# Patient Record
Sex: FEMALE | Race: WHITE | NOT HISPANIC OR LATINO | Employment: OTHER | URBAN - METROPOLITAN AREA
[De-identification: names, ages, dates, MRNs, and addresses within clinical notes are randomized per-mention and may not be internally consistent; named-entity substitution may affect disease eponyms.]

---

## 2018-02-22 ENCOUNTER — HOSPITAL ENCOUNTER (OUTPATIENT)
Dept: ORTHOPEDIC SURGERY | Facility: CLINIC | Age: 68
Discharge: HOME | End: 2018-02-22
Attending: ORTHOPAEDIC SURGERY | Admitting: ORTHOPAEDIC SURGERY

## 2018-03-14 ENCOUNTER — HOSPITAL ENCOUNTER (OUTPATIENT)
Dept: ORTHOPEDIC SURGERY | Facility: CLINIC | Age: 68
Discharge: HOME | End: 2018-03-14
Attending: ORTHOPAEDIC SURGERY | Admitting: ORTHOPAEDIC SURGERY

## 2018-03-20 ENCOUNTER — HOSPITAL ENCOUNTER (OUTPATIENT)
Dept: MRI IMAGING | Facility: HOSPITAL | Age: 68
Discharge: HOME | End: 2018-03-20
Attending: ORTHOPAEDIC SURGERY | Admitting: ORTHOPAEDIC SURGERY

## 2018-04-06 ENCOUNTER — HOSPITAL ENCOUNTER (OUTPATIENT)
Dept: PREADMISSION TESTING | Facility: HOSPITAL | Age: 68
Discharge: HOME | End: 2018-04-06
Attending: ORTHOPAEDIC SURGERY | Admitting: ORTHOPAEDIC SURGERY

## 2018-04-06 LAB
ANION GAP SERPL CALC-SCNC: 12.4 MMOL/L (ref 10–20)
APTT BLD: 25.1 SEC (ref 24–31)
BASOPHILS # BLD AUTO: 0 10*3/UL (ref 0–0.2)
BASOPHILS NFR BLD AUTO: 1 % (ref 0–2)
BUN SERPL-MCNC: 10 MG/DL (ref 8–20)
BUN/CREAT SERPL: 14.3 (ref 5.4–26.2)
CALCIUM SERPL-MCNC: 9.1 MG/DL (ref 8.9–10.3)
CHLORIDE SERPL-SCNC: 102 MMOL/L (ref 101–111)
CONV CO2: 30 MMOL/L (ref 22–32)
CREAT UR-MCNC: 0.7 MG/DL (ref 0.4–1)
DIFFERENTIAL METHOD BLD: (no result)
EOSINOPHIL # BLD AUTO: 0.1 10*3/UL (ref 0–0.3)
EOSINOPHIL # BLD AUTO: 2 % (ref 0–3)
ERYTHROCYTE [DISTWIDTH] IN BLOOD BY AUTOMATED COUNT: 12 % (ref 11.5–14.5)
GLUCOSE SERPL-MCNC: 104 MG/DL (ref 65–99)
HCT VFR BLD AUTO: 39.7 % (ref 35–49)
HGB BLD-MCNC: 13.3 G/DL (ref 12–15)
INR PPP: 1
LYMPHOCYTES # BLD AUTO: 2.3 10*3/UL (ref 0.8–4.8)
LYMPHOCYTES NFR BLD AUTO: 43 % (ref 18–42)
MCH RBC QN AUTO: 32.4 PG (ref 26–32)
MCHC RBC AUTO-ENTMCNC: 33.6 G/DL (ref 32–36)
MCV RBC AUTO: 96.5 FL (ref 80–94)
MONOCYTES # BLD AUTO: 0.6 10*3/UL (ref 0.1–1.3)
MONOCYTES NFR BLD AUTO: 10 % (ref 2–11)
NEUTROPHILS # BLD AUTO: 2.4 10*3/UL (ref 2.3–8.6)
NEUTROPHILS NFR BLD AUTO: 44 % (ref 50–75)
NRBC BLD AUTO-RTO: 0 /100{WBCS}
NRBC/RBC NFR BLD MANUAL: 0 10*3/UL
PLATELET # BLD AUTO: 293 10*3/UL (ref 150–450)
PMV BLD AUTO: 8.1 FL (ref 7.4–10.4)
POTASSIUM SERPL-SCNC: 4.4 MMOL/L (ref 3.6–5.1)
PROTHROMBIN TIME: 10.5 SEC (ref 9.6–11.7)
RBC # BLD AUTO: 4.12 10*6/UL (ref 4–5.4)
SODIUM SERPL-SCNC: 140 MMOL/L (ref 136–144)
WBC # BLD AUTO: 5.4 10*3/UL (ref 4.5–11.5)

## 2018-04-13 ENCOUNTER — HOSPITAL ENCOUNTER (OUTPATIENT)
Dept: PREOP | Facility: HOSPITAL | Age: 68
Setting detail: HOSPITAL OUTPATIENT SURGERY
Discharge: HOME | End: 2018-04-13
Attending: ORTHOPAEDIC SURGERY | Admitting: ORTHOPAEDIC SURGERY

## 2018-05-09 ENCOUNTER — HOSPITAL ENCOUNTER (OUTPATIENT)
Dept: OTHER | Facility: HOSPITAL | Age: 68
Setting detail: RECURRING SERIES
Discharge: HOME | End: 2018-07-29
Attending: ORTHOPAEDIC SURGERY | Admitting: ORTHOPAEDIC SURGERY

## 2018-05-10 ENCOUNTER — HOSPITAL ENCOUNTER (OUTPATIENT)
Dept: ORTHOPEDIC SURGERY | Facility: CLINIC | Age: 68
Setting detail: SPECIMEN
Discharge: HOME | End: 2018-05-10
Attending: PHYSICIAN ASSISTANT | Admitting: PHYSICIAN ASSISTANT

## 2018-05-10 LAB
ALBUMIN SERPL-MCNC: 4.3 G/DL (ref 3.5–4.8)
ALBUMIN/GLOB SERPL: 1.7 {RATIO} (ref 1–1.7)
ALP SERPL-CCNC: 77 IU/L (ref 32–91)
ALT SERPL-CCNC: 46 IU/L (ref 14–54)
ANION GAP SERPL CALC-SCNC: 14.5 MMOL/L (ref 10–20)
AST SERPL-CCNC: 24 IU/L (ref 15–41)
BASOPHILS # BLD AUTO: 0 10*3/UL (ref 0–0.2)
BASOPHILS NFR BLD AUTO: 0 % (ref 0–2)
BILIRUB SERPL-MCNC: 0.2 MG/DL (ref 0.3–1.2)
BUN SERPL-MCNC: 14 MG/DL (ref 8–20)
BUN/CREAT SERPL: 20 (ref 5.4–26.2)
CALCIUM SERPL-MCNC: 9.3 MG/DL (ref 8.9–10.3)
CHLORIDE SERPL-SCNC: 101 MMOL/L (ref 101–111)
CONV CO2: 27 MMOL/L (ref 22–32)
CONV TOTAL PROTEIN: 6.9 G/DL (ref 6.1–7.9)
CREAT UR-MCNC: 0.7 MG/DL (ref 0.4–1)
DIFFERENTIAL METHOD BLD: (no result)
EOSINOPHIL # BLD AUTO: 0.1 10*3/UL (ref 0–0.3)
EOSINOPHIL # BLD AUTO: 2 % (ref 0–3)
ERYTHROCYTE [DISTWIDTH] IN BLOOD BY AUTOMATED COUNT: 12 % (ref 11.5–14.5)
GLOBULIN UR ELPH-MCNC: 2.6 G/DL (ref 2.5–3.8)
GLUCOSE SERPL-MCNC: 91 MG/DL (ref 65–99)
HCT VFR BLD AUTO: 40.5 % (ref 35–49)
HGB BLD-MCNC: 13.3 G/DL (ref 12–15)
LYMPHOCYTES # BLD AUTO: 3 10*3/UL (ref 0.8–4.8)
LYMPHOCYTES NFR BLD AUTO: 40 % (ref 18–42)
MAGNESIUM SERPL-MCNC: 1.9 MG/DL (ref 1.8–2.5)
MCH RBC QN AUTO: 31.8 PG (ref 26–32)
MCHC RBC AUTO-ENTMCNC: 32.9 G/DL (ref 32–36)
MCV RBC AUTO: 96.5 FL (ref 80–94)
MONOCYTES # BLD AUTO: 0.7 10*3/UL (ref 0.1–1.3)
MONOCYTES NFR BLD AUTO: 10 % (ref 2–11)
NEUTROPHILS # BLD AUTO: 3.5 10*3/UL (ref 2.3–8.6)
NEUTROPHILS NFR BLD AUTO: 48 % (ref 50–75)
NRBC BLD AUTO-RTO: 0 /100{WBCS}
NRBC/RBC NFR BLD MANUAL: 0 10*3/UL
PHOSPHATE SERPL-MCNC: 3 MG/DL (ref 2.4–4.7)
PLATELET # BLD AUTO: 316 10*3/UL (ref 150–450)
PMV BLD AUTO: 7.9 FL (ref 7.4–10.4)
POTASSIUM SERPL-SCNC: 4.5 MMOL/L (ref 3.6–5.1)
RBC # BLD AUTO: 4.2 10*6/UL (ref 4–5.4)
SODIUM SERPL-SCNC: 138 MMOL/L (ref 136–144)
WBC # BLD AUTO: 7.4 10*3/UL (ref 4.5–11.5)

## 2018-06-12 ENCOUNTER — HOSPITAL ENCOUNTER (OUTPATIENT)
Dept: OTHER | Facility: HOSPITAL | Age: 68
Discharge: HOME | End: 2018-06-12
Attending: PHYSICIAN ASSISTANT | Admitting: PHYSICIAN ASSISTANT

## 2018-08-21 ENCOUNTER — CONVERSION ENCOUNTER (OUTPATIENT)
Dept: PAIN MEDICINE | Facility: CLINIC | Age: 68
End: 2018-08-21

## 2018-08-23 ENCOUNTER — HOSPITAL ENCOUNTER (OUTPATIENT)
Dept: PAIN MEDICINE | Facility: HOSPITAL | Age: 68
Discharge: HOME | End: 2018-08-23
Attending: PHYSICAL MEDICINE & REHABILITATION | Admitting: PHYSICAL MEDICINE & REHABILITATION

## 2018-08-23 LAB
AMPHETAMINES UR QL SCN: NEGATIVE
BARBITURATES UR QL SCN: NEGATIVE
BENZODIAZ UR QL SCN: ABNORMAL
BZE UR QL SCN: NEGATIVE
CREAT 24H UR-MCNC: ABNORMAL MG/DL
METHADONE UR QL SCN: NEGATIVE
OPIATE CONFIRMATION URINE: ABNORMAL
OPIATES TESTED UR SCN: ABNORMAL
PCP UR QL: NEGATIVE
THC SERPLBLD CFM-MCNC: NEGATIVE NG/ML

## 2018-08-30 ENCOUNTER — HOSPITAL ENCOUNTER (OUTPATIENT)
Dept: PAIN MEDICINE | Facility: HOSPITAL | Age: 68
Discharge: HOME | End: 2018-08-30
Attending: PHYSICAL MEDICINE & REHABILITATION | Admitting: PHYSICAL MEDICINE & REHABILITATION

## 2018-10-16 ENCOUNTER — HOSPITAL ENCOUNTER (OUTPATIENT)
Dept: PAIN MEDICINE | Facility: HOSPITAL | Age: 68
Discharge: HOME | End: 2018-10-16
Attending: PHYSICAL MEDICINE & REHABILITATION | Admitting: PHYSICAL MEDICINE & REHABILITATION

## 2018-12-13 ENCOUNTER — HOSPITAL ENCOUNTER (OUTPATIENT)
Dept: PAIN MEDICINE | Facility: HOSPITAL | Age: 68
Discharge: HOME | End: 2018-12-13
Attending: PHYSICAL MEDICINE & REHABILITATION | Admitting: PHYSICAL MEDICINE & REHABILITATION

## 2018-12-19 ENCOUNTER — HOSPITAL ENCOUNTER (OUTPATIENT)
Dept: OTHER | Facility: HOSPITAL | Age: 68
Setting detail: SPECIMEN
Discharge: HOME | End: 2018-12-19
Attending: NURSE PRACTITIONER | Admitting: NURSE PRACTITIONER

## 2018-12-19 LAB
ALBUMIN SERPL-MCNC: 4.3 G/DL (ref 3.5–4.8)
ALBUMIN/GLOB SERPL: 1.9 {RATIO} (ref 1–1.7)
ALP SERPL-CCNC: 70 IU/L (ref 32–91)
ALT SERPL-CCNC: 36 IU/L (ref 14–54)
ANION GAP SERPL CALC-SCNC: 12.8 MMOL/L (ref 10–20)
AST SERPL-CCNC: 22 IU/L (ref 15–41)
BASOPHILS # BLD AUTO: 0 10*3/UL (ref 0–0.2)
BASOPHILS NFR BLD AUTO: 0 % (ref 0–2)
BILIRUB SERPL-MCNC: 0.3 MG/DL (ref 0.3–1.2)
BUN SERPL-MCNC: 9 MG/DL (ref 8–20)
BUN/CREAT SERPL: 15 (ref 5.4–26.2)
CALCIUM SERPL-MCNC: 9.1 MG/DL (ref 8.9–10.3)
CHLORIDE SERPL-SCNC: 102 MMOL/L (ref 101–111)
CHOLEST SERPL-MCNC: 182 MG/DL
CHOLEST/HDLC SERPL: 2.9 {RATIO}
CONV CO2: 26 MMOL/L (ref 22–32)
CONV LDL CHOLESTEROL DIRECT: 107 MG/DL (ref 0–100)
CONV TOTAL PROTEIN: 6.6 G/DL (ref 6.1–7.9)
CREAT UR-MCNC: 0.6 MG/DL (ref 0.4–1)
DIFFERENTIAL METHOD BLD: (no result)
EOSINOPHIL # BLD AUTO: 0.2 10*3/UL (ref 0–0.3)
EOSINOPHIL # BLD AUTO: 2 % (ref 0–3)
ERYTHROCYTE [DISTWIDTH] IN BLOOD BY AUTOMATED COUNT: 13 % (ref 11.5–14.5)
GLOBULIN UR ELPH-MCNC: 2.3 G/DL (ref 2.5–3.8)
GLUCOSE SERPL-MCNC: 90 MG/DL (ref 65–99)
HCT VFR BLD AUTO: 40.5 % (ref 35–49)
HDLC SERPL-MCNC: 62 MG/DL
HGB BLD-MCNC: 13.5 G/DL (ref 12–15)
LDLC/HDLC SERPL: 1.7 {RATIO}
LIPID INTERPRETATION: ABNORMAL
LYMPHOCYTES # BLD AUTO: 2.5 10*3/UL (ref 0.8–4.8)
LYMPHOCYTES NFR BLD AUTO: 37 % (ref 18–42)
MCH RBC QN AUTO: 32.9 PG (ref 26–32)
MCHC RBC AUTO-ENTMCNC: 33.4 G/DL (ref 32–36)
MCV RBC AUTO: 98.6 FL (ref 80–94)
MONOCYTES # BLD AUTO: 0.6 10*3/UL (ref 0.1–1.3)
MONOCYTES NFR BLD AUTO: 9 % (ref 2–11)
NEUTROPHILS # BLD AUTO: 3.6 10*3/UL (ref 2.3–8.6)
NEUTROPHILS NFR BLD AUTO: 52 % (ref 50–75)
NRBC BLD AUTO-RTO: 0 /100{WBCS}
NRBC/RBC NFR BLD MANUAL: 0 10*3/UL
PLATELET # BLD AUTO: 284 10*3/UL (ref 150–450)
PMV BLD AUTO: 8.3 FL (ref 7.4–10.4)
POTASSIUM SERPL-SCNC: 3.8 MMOL/L (ref 3.6–5.1)
RBC # BLD AUTO: 4.1 10*6/UL (ref 4–5.4)
SODIUM SERPL-SCNC: 137 MMOL/L (ref 136–144)
TRIGL SERPL-MCNC: 137 MG/DL
TSH SERPL-ACNC: 0.67 UIU/ML (ref 0.34–5.6)
VIT B12 SERPL-MCNC: 127 PG/ML (ref 180–914)
VLDLC SERPL CALC-MCNC: 13 MG/DL
WBC # BLD AUTO: 7 10*3/UL (ref 4.5–11.5)

## 2018-12-20 LAB
25(OH)D3 SERPL-MCNC: 22 NG/ML (ref 30–100)
HBA1C MFR BLD: 5.7 % (ref 0–5.6)

## 2018-12-28 ENCOUNTER — HOSPITAL ENCOUNTER (OUTPATIENT)
Dept: CARDIOLOGY | Facility: HOSPITAL | Age: 68
Discharge: HOME | End: 2018-12-28
Attending: NURSE PRACTITIONER | Admitting: NURSE PRACTITIONER

## 2019-01-14 ENCOUNTER — HOSPITAL ENCOUNTER (OUTPATIENT)
Dept: OTHER | Facility: HOSPITAL | Age: 69
Discharge: HOME | End: 2019-01-14
Attending: NURSE PRACTITIONER | Admitting: NURSE PRACTITIONER

## 2019-01-17 ENCOUNTER — HOSPITAL ENCOUNTER (OUTPATIENT)
Dept: MRI IMAGING | Facility: HOSPITAL | Age: 69
Discharge: HOME | End: 2019-01-17
Attending: NURSE PRACTITIONER | Admitting: NURSE PRACTITIONER

## 2019-02-12 ENCOUNTER — HOSPITAL ENCOUNTER (OUTPATIENT)
Dept: PAIN MEDICINE | Facility: HOSPITAL | Age: 69
Discharge: HOME | End: 2019-02-12
Attending: PHYSICAL MEDICINE & REHABILITATION | Admitting: PHYSICAL MEDICINE & REHABILITATION

## 2019-05-14 ENCOUNTER — HOSPITAL ENCOUNTER (OUTPATIENT)
Dept: PAIN MEDICINE | Facility: HOSPITAL | Age: 69
Discharge: HOME | End: 2019-05-14
Attending: PHYSICAL MEDICINE & REHABILITATION | Admitting: PHYSICAL MEDICINE & REHABILITATION

## 2019-06-03 ENCOUNTER — HOSPITAL ENCOUNTER (OUTPATIENT)
Dept: PAIN MEDICINE | Facility: HOSPITAL | Age: 69
Discharge: HOME | End: 2019-06-03
Attending: PHYSICAL MEDICINE & REHABILITATION | Admitting: PHYSICAL MEDICINE & REHABILITATION

## 2019-06-03 ENCOUNTER — CONVERSION ENCOUNTER (OUTPATIENT)
Dept: BEHAVIORAL HEALTH | Facility: OTHER | Age: 69
End: 2019-06-03

## 2019-06-04 VITALS
SYSTOLIC BLOOD PRESSURE: 142 MMHG | DIASTOLIC BLOOD PRESSURE: 90 MMHG | OXYGEN SATURATION: 94 % | HEART RATE: 66 BPM | HEIGHT: 65 IN | RESPIRATION RATE: 16 BRPM | BODY MASS INDEX: 31.65 KG/M2 | WEIGHT: 190 LBS

## 2019-06-04 VITALS — WEIGHT: 204 LBS | BODY MASS INDEX: 33.99 KG/M2 | HEIGHT: 65 IN

## 2019-06-07 NOTE — PROCEDURES
HPI: All over pain, 10/10 at worst, 7/10 at best, 7/10 today, always present, varies, aching, stabbing, worse with all activity, interferes with ADLs, sleep, quality of life, failed meds, injections, surgery, PT. Had multiple injections with Dr. Villalpando and   Roddy. Imaging with R TKA, severe L knee OA, full thickness RTC tear. Prior notes reviewed, as abov, with referral for pain management, started on Norco 7.5/325mg BID, then TID, helps but not strong enough, now 10/325mg QID prn. Tried rotating to Percocet   with headaches, stopped.    Referring MD: Val Julien NP  Age: 68 Years Old  Sex: Female  Race: White    Pain Assessment   Previous Pain Rating : 6  Current Pain Ratin  Last Dose Pain medicine [place] 6/3@0100  Have your bowel habits changed since you started taking pain medication? No  Epidural History Epidurals help      Past Medical History:     Reviewed history from 2018 and no changes required:        Cervical/lumbar disk dz        Chronic pain syndrome        DDD/OA        manic Depression        Insomnia        Venous insufficiecny        HTN        Osteoporosis (DEXA )- fosamax 2yrs, prolia x1        COPD - severe        H/O MRSA        H/O epilepsy        H/O diverticulitis        Anxiety        G E R D        Arthritis        Sleep apnea        CVA/Stroke    Past Surgical History:     Reviewed history from 2018 and no changes required:        Total hysterectomy - noncancer        2-(r) knee surgeries        Cholecystectomy        ulnar nerve release        pancreatic stent        Brain surgery - meningiomas 2010        Breast biopsies        Lasik surgery        right knee replacement- 2013        left shoulder scope/ cuff repair 18         D&C        Tubal ligaition         Appendectomy        Back Surgery    Family History Summary:      Reviewed history Last on 2019 and no changes required:2019  Other Family Member - Has Family History of Other Cancer -  Entered On: 2018  Other Family Member - Has Family History of High Cholesterol - Entered On: 2018  Other Family Member - Has Family History of Hypertension - Entered On: 2018  Other Family Member - Has Family History of Heart Disease - Entered On: 2018  Other Family Member - Has Family History of Diabetes - Entered On: 2018    General Comments - FH:  No breast cancer, no colon cancer, no GYN cancer    Father - COPD    Has FH of substance abuse    Social History:     Reviewed history from 2018 and no changes required:        Patient has never smoked.        Passive Smoke: N        Alcohol Use: Y        Drug Use: N        HIV/High Risk: N        Regular Exercise: N                Passive Smoke: N        Vital Signs:    Patient Profile:    68 Years Old Female  CC:         Lars. knee pain  Height:     65 inches (170.18 cm)  Weight:     190 pounds  BMI:        31.61     O2 Sat:     94 %  Temp:       97.8 degrees F oral  Pulse rate: 66 / minute  Resp:       16 per minute  BP Sittin / 90    Patient has a risk of falls? No  Patient in pain?    Yes    Problems: Active problems were reviewed with the patient during this visit.  Medications: Medications were reviewed with the patient during this visit.  Allergies: Allergies were reviewed with the patient during this visit.        Vitals Entered By: Christina Dill MA ([place]  3, 2019 11:00 AM)      Risk Factors:     Smoked Tobacco Use:  Former smoker     Cigarettes:  Yes -- 1/2 pack(s) per day,    Pack-years:  60+        Year started:          Year quit:  2013  Smokeless Tobacco Use:  Never  Passive smoke exposure:  no  Drug use:  no  HIV high-risk behavior:  no  Caffeine use:  1 drinks per day  Alcohol use:  yes     Drinks per day:  social  Exercise:  no  Seatbelt use:  100 %  Sun Exposure:  rarely    Family History Risk Factors:     Family History of MI in females < 65 years old:  no     Family History of MI in males < 55 years old:   no    Previous Tobacco Use: Signed On - 05/14/2019  Smoked Tobacco Use:  Former smoker     Cigarettes:  Yes -- 1/2 pack(s) per day,    Pack-years:  60+        Year started:  1970        Year quit:  9/2013  Smokeless Tobacco Use:  Never     Counseled to quit/cut down:  yes  Passive smoke exposure:  no  Drug use:  no  HIV high-risk behavior:  no  Caffeine use:  1 drinks per day        Review of Systems        See HPI    Physical Exam   General  General appearance: well developed, well nourished, no acute distress  Communication ability: communicates by voice, normal quality  Gait and station: antalgic    Mental Status Exam   Mental Status: AAO x3  Thought Content: Intact  Behavior: Appropriate    Head and Face   Inspection: normocephalic, no scars, lesions, or masses  Facial strength: no weakness    Respiratory   Auscultation: clear to auscultation bilaterally, no rales, rhonchi, or wheezes    Cardiovascular   Auscultation: RRR, no murmur, rub, or gallop      EXAM:     Assessment   New Problems:  Osteoarthritis, knee, left (ICD-715.96) (BUG73-A49.9)      Plan   Updated Medication List:   HYDROCODONE-ACETAMINOPHEN  MG ORAL TABLET (HYDROCODONE-ACETAMINOPHEN) Take one tab q6h prn  pain              DX: M25.50. DNF until 7/16/19.  ALBUTEROL NEB 0.083% (ALBUTEROL SULFATE) USE 1 VIAL THREE TIMES DAILY FOR A TOTAL OF THREE VIALS DAILY.  TRAZODONE  MG ORAL TABLET (TRAZODONE HCL) 1 by mouth every night  SYMBICORT 160-4.5 MCG INHALER (BUDESONIDE-FORMOTEROL FUMARATE) INHALE 2 PUFFS BY MOUTH TWICE A DAY  COMBIVENT RESPIMAT  MCG/ACT INHALATION AEROSOL SOLUTION (IPRATROPIUM-ALBUTEROL) One inhalation every 4 hours as needed (maximum 6/day)  POTASSIUM CHLORIDE ER 10 MEQ ORAL TABLET EXTENDED RELEASE (POTASSIUM CHLORIDE) Take one tab po w each dose of Lasix.  FUROSEMIDE 40 MG ORAL TABLET (FUROSEMIDE) Take 1 tab in am and 1 tab in pm prn edema.  ONDANSETRON HCL 8 MG TABLET (ONDANSETRON HCL) TAKE ONE TABLET BY MOUTH  "TWICE A DAY AS NEEDED  ALPRAZOLAM 1 MG ORAL TABLET (ALPRAZOLAM) 1 po QHS  as needed for anxiety  ATENOLOL 50 MG TABLET (ATENOLOL) TAKE 1 TABLET BY MOUTH TWICE A DAY    New Orders:   Ofc Vst, Est Level III [[id number]]  Ortho Consult [OrthOC]    .    Patient Instructions:  1)  INspect in order. Mod risk per SOAPP. UDS 8/23/18 in order.  2)  Increased to Norco 10/325mg TID prn, then QID. Rotated to Percocet 10/325mg QID prn, was taking as high as Oxycodone 15mg TID in recent past, had headaches, restarted Norco, reports doing well now.  3)  Failed Diclofenac cream. Reorder RxAlt #1 cream.  4)  Cont other meds as prescribed.  5)  Referred to Rheum for prior dz of autoimmune disease.  6)  Failed PT, surgery, injections.  7)  Ordered  PT, eval for other needs.  8)  1st of 3 L knee Supartz injections, failed steroids in past. Is s/p R TKA.  9)  May need to inject b/l CMC thumb joints.  10)  Referral to Gayle Pate to eval for revision of R TKA.  11)  RTC for injections then in 3 months for f/u.      Pain Management Procedure Care Plan   Medication Administration:     ]DATE OF PROCEDURE: [place]  3, 2019    KNEE SUPARTZ INJECTION    PREOPERATIVE DIAGNOSIS:  Knee pain    POSTOPERATIVE DIAGNOSIS:  Knee pain    PROCEDURE PERFORMED:  LEFT SUPARTZ KNEE INJECTION     The patient understands the risks and benefits of the procedure and wishes to proceed. The patient was seen in the preoperative area. Patient's consent was obtained and updated. Vitals were taken. Patient was then placed in a seated position for the   injection. Site marked for an inferior lateral approach, and a 22 gauge 1.5\" needle was passed through skin anesthetized with 1% Lidocaine without epinephrine. The needle tip was advanced to the joint space, the syringes were exchanged, and Supartz was   injected after negative aspiration. The patient tolerated with no iris-procedural complications.  A sterile dressing was placed over the puncture " site.              Electronically signed by Kervin Hanna MD on 06/03/2019 at 11:31 AM  ________________________________________________________________________       Disclaimer: Converted Note message may not contain all data elements that existed in the legacy source system. Please see [email or web address] for the original note details.

## 2019-06-10 ENCOUNTER — CONVERSION ENCOUNTER (OUTPATIENT)
Dept: PAIN MEDICINE | Facility: CLINIC | Age: 69
End: 2019-06-10

## 2019-06-10 ENCOUNTER — HOSPITAL ENCOUNTER (OUTPATIENT)
Dept: PAIN MEDICINE | Facility: HOSPITAL | Age: 69
Discharge: HOME | End: 2019-06-10
Attending: PHYSICAL MEDICINE & REHABILITATION | Admitting: PHYSICAL MEDICINE & REHABILITATION

## 2019-06-12 VITALS
HEART RATE: 71 BPM | RESPIRATION RATE: 16 BRPM | SYSTOLIC BLOOD PRESSURE: 161 MMHG | DIASTOLIC BLOOD PRESSURE: 93 MMHG | BODY MASS INDEX: 32.32 KG/M2 | WEIGHT: 194 LBS | HEIGHT: 65 IN | OXYGEN SATURATION: 93 %

## 2019-06-13 NOTE — PROCEDURES
HPI: All over pain, 10/10 at worst, 7/10 at best, 8/10 today, always present, varies, aching, stabbing, worse with all activity, interferes with ADLs, sleep, quality of life, failed meds, injections, surgery, PT. Had multiple injections with Dr. Villalpando and   Roddy. Imaging with R TKA, severe L knee OA, full thickness RTC tear. Prior notes reviewed, as abov, with referral for pain management, started on Norco 7.5/325mg BID, then TID, helps but not strong enough, now 10/325mg QID prn. Tried rotating to Percocet   with headaches, stopped.    Referring MD: Val Julien NP  Age: 68 Years Old  Sex: Female  Race: White    Pain Assessment   Previous Pain Rating : 8  Current Pain Ratin  Last Dose Pain medicine [place] 6/10@ 0300  Have your bowel habits changed since you started taking pain medication? No  Epidural History Knee injections help      Past Medical History:     Reviewed history from 2018 and no changes required:        Cervical/lumbar disk dz        Chronic pain syndrome        DDD/OA        manic Depression        Insomnia        Venous insufficiecny        HTN        Osteoporosis (DEXA )- fosamax 2yrs, prolia x1        COPD - severe        H/O MRSA        H/O epilepsy        H/O diverticulitis        Anxiety        G E R D        Arthritis        Sleep apnea        CVA/Stroke    Past Surgical History:     Reviewed history from 2018 and no changes required:        Total hysterectomy - noncancer        2-(r) knee surgeries        Cholecystectomy        ulnar nerve release        pancreatic stent        Brain surgery - meningiomas 2010        Breast biopsies        Lasik surgery        right knee replacement- 2013        left shoulder scope/ cuff repair 18         D&C        Tubal ligaition         Appendectomy        Back Surgery    Family History Summary:      Reviewed history Last on 2019 and no changes required:06/10/2019  Other Family Member - Has Family History of Other  Cancer - Entered On: 2018  Other Family Member - Has Family History of High Cholesterol - Entered On: 2018  Other Family Member - Has Family History of Hypertension - Entered On: 2018  Other Family Member - Has Family History of Heart Disease - Entered On: 2018  Other Family Member - Has Family History of Diabetes - Entered On: 2018    General Comments - FH:  No breast cancer, no colon cancer, no GYN cancer    Father - COPD    Has FH of substance abuse    Social History:     Reviewed history from 2018 and no changes required:        Patient has never smoked.        Passive Smoke: N        Alcohol Use: Y        Drug Use: N        HIV/High Risk: N        Regular Exercise: N                Passive Smoke: N        Vital Signs:    Patient Profile:    68 Years Old Female  CC:         Lars. knee pain  Height:     65 inches (170.18 cm)  Weight:     194 pounds  BMI:        32.28     O2 Sat:     93 %  Temp:       98.2 degrees F oral  Pulse rate: 71 / minute  Resp:       16 per minute  BP Sittin / 93    Patient has a risk of falls? No  Patient in pain?    Yes    Problems: Active problems were reviewed with the patient during this visit.  Medications: Medications were reviewed with the patient during this visit.  Allergies: Allergies were reviewed with the patient during this visit.        Vitals Entered By: Christina Dill MA ([place] 10, 2019 11:28 AM)      Risk Factors:     Smoked Tobacco Use:  Former smoker     Cigarettes:  Yes -- 1/2 pack(s) per day,    Pack-years:  60+        Year started:          Year quit:  2013  Smokeless Tobacco Use:  Never  Passive smoke exposure:  no  Drug use:  no  HIV high-risk behavior:  no  Caffeine use:  1 drinks per day  Alcohol use:  yes     Drinks per day:  social  Exercise:  no  Seatbelt use:  100 %  Sun Exposure:  rarely    Family History Risk Factors:     Family History of MI in females < 65 years old:  no     Family History of MI in males < 55 years  old:  no    Previous Tobacco Use: Signed On - 06/03/2019  Smoked Tobacco Use:  Former smoker     Cigarettes:  Yes -- 1/2 pack(s) per day,    Pack-years:  60+        Year started:  1970        Year quit:  9/2013  Smokeless Tobacco Use:  Never     Counseled to quit/cut down:  yes  Passive smoke exposure:  no  Drug use:  no  HIV high-risk behavior:  no  Caffeine use:  1 drinks per day        Review of Systems        See HPI    General       Complains of fatigue.       Denies fever and chills.    Eyes       Complains of vision loss - both eyes.    ENT       Complains of decreased hearing.       Denies difficulty swallowing.    CV       Complains of chest pain or discomfort.    Resp       Complains of shortness of breath.    GI       Complains of nausea and abdominal pain.       Denies vomiting, diarrhea, constipation and stool incontinence.           Denies inability to control bladder.    MS       Complains of joint pain, joint swelling, back pain and neck pain.    Derm       Complains of rash.    Neuro       Complains of headaches and dizziness.       Denies numbness and weakness.    Physical Exam   General  General appearance: well developed, well nourished, no acute distress  Communication ability: communicates by voice, normal quality  Gait and station: antalgic    Mental Status Exam   Mental Status: AAO x3  Thought Content: Intact  Behavior: Appropriate    Head and Face   Inspection: normocephalic, no scars, lesions, or masses  Facial strength: no weakness    Respiratory   Auscultation: clear to auscultation bilaterally, no rales, rhonchi, or wheezes    Cardiovascular   Auscultation: RRR, no murmur, rub, or gallop    Abdomen   Abdomen: soft, non-tender, non-distended, no masses, bowel sounds normal    Extremities   Pedal pulses: pulses 1+, symmetric  Periph. circulation: no cyanosis, clubbing, edema, or varicosities      EXAM:     Neuro   Reflexes: R Arm 1+  L arm 1+  R Leg 1+  L Leg 1+    Strength: Arms Normal   Strength: Legs Normal  Sensation: SILT BUE and BLE      Assessment   Status of Existing Problems:  Assessed Osteoarthritis, knee, left as unchanged - Kervin Hanna MD  Assessed Knee Pain, left as improved - Kervin Hanna MD  Assessed Pain in joint involving multiple sites as unchanged - Kervin Hanna MD  Assessed Low back pain, chronic as deteriorated - Kervin Hanna MD  Assessed Neck pain, chronic as unchanged - Kervin Hanna MD    Plan   Updated Medication List:   HYDROCODONE-ACETAMINOPHEN  MG ORAL TABLET (HYDROCODONE-ACETAMINOPHEN) Take one tab q6h prn  pain              DX: M25.50. DNF until 7/16/19.  ALBUTEROL NEB 0.083% (ALBUTEROL SULFATE) USE 1 VIAL THREE TIMES DAILY FOR A TOTAL OF THREE VIALS DAILY.  TRAZODONE  MG ORAL TABLET (TRAZODONE HCL) 1 by mouth every night  SYMBICORT 160-4.5 MCG INHALER (BUDESONIDE-FORMOTEROL FUMARATE) INHALE 2 PUFFS BY MOUTH TWICE A DAY  COMBIVENT RESPIMAT  MCG/ACT INHALATION AEROSOL SOLUTION (IPRATROPIUM-ALBUTEROL) One inhalation every 4 hours as needed (maximum 6/day)  POTASSIUM CHLORIDE ER 10 MEQ ORAL TABLET EXTENDED RELEASE (POTASSIUM CHLORIDE) Take one tab po w each dose of Lasix.  FUROSEMIDE 40 MG ORAL TABLET (FUROSEMIDE) Take 1 tab in am and 1 tab in pm prn edema.  ONDANSETRON HCL 8 MG TABLET (ONDANSETRON HCL) TAKE ONE TABLET BY MOUTH TWICE A DAY AS NEEDED  ALPRAZOLAM 1 MG ORAL TABLET (ALPRAZOLAM) 1 po QHS  as needed for anxiety  ATENOLOL 50 MG TABLET (ATENOLOL) TAKE 1 TABLET BY MOUTH TWICE A DAY    New Orders:   Ofc Vst, Est Level IV [[id number]]    .    Patient Instructions:  1)  IN[place] in order. Mod risk per SOAPP. UDS 8/23/18 in order.  2)  2)  Increased to Norco 10/325mg TID prn, then QID, stop. Begin Percocet 10mg QID prn, was taking as high as Oxycodone 15mg TID in recent past.  3)  Failed Diclofenac cream. Reorder RxAlt #1 cream.  4)  Cont other meds as prescribed.  5)  Referred to Rheum for prior dz of autoimmune disease.  6)   "Failed PT, surgery, injections.  7)  Ordered HH PT, eval for other needs.  8)  2nd of 3 L knee Supartz injections, failed steroids in past. Is s/p R TKA.  9)  May need to inject b/l CMC thumb joints.  10)  Referral to Gayle Pate to eval for revision of R TKA.  11)  RTC for injections then in 3 months for f/u.      Pain Management Procedure Care Plan   Medication Administration:     ]DATE OF PROCEDURE: [place] 10, 2019    KNEE SUPARTZ INJECTION    PREOPERATIVE DIAGNOSIS:  Knee pain    POSTOPERATIVE DIAGNOSIS:  Knee pain    PROCEDURE PERFORMED:  LEFT SUPARTZ KNEE INJECTION     The patient understands the risks and benefits of the procedure and wishes to proceed. The patient was seen in the preoperative area. Patient's consent was obtained and updated. Vitals were taken. Patient was then placed in a seated position for the   injection. Site marked for an inferior lateral approach, and a 22 gauge 1.5\" needle was passed through skin anesthetized with 1% Lidocaine without epinephrine. The needle tip was advanced to the joint space, the syringes were exchanged, and Supartz was   injected after negative aspiration. The patient tolerated with no iris-procedural complications.  A sterile dressing was placed over the puncture site.              Electronically signed by Kervin Hanna MD on 06/10/2019 at 12:09 PM  ________________________________________________________________________       Disclaimer: Converted Note message may not contain all data elements that existed in the [employer] source system. Please see [employer] System for the original note details.  "

## 2019-06-16 ENCOUNTER — HOSPITAL ENCOUNTER (EMERGENCY)
Dept: GENERAL RADIOLOGY | Facility: HOSPITAL | Age: 69
Discharge: HOME | End: 2019-06-16

## 2019-06-16 ENCOUNTER — HOSPITAL ENCOUNTER (EMERGENCY)
Facility: HOSPITAL | Age: 69
Discharge: HOME | End: 2019-06-16
Attending: EMERGENCY MEDICINE | Admitting: EMERGENCY MEDICINE

## 2019-06-16 VITALS
HEART RATE: 65 BPM | TEMPERATURE: 97.9 F | DIASTOLIC BLOOD PRESSURE: 72 MMHG | HEIGHT: 66 IN | OXYGEN SATURATION: 90 % | WEIGHT: 191.58 LBS | RESPIRATION RATE: 20 BRPM | SYSTOLIC BLOOD PRESSURE: 136 MMHG | BODY MASS INDEX: 30.79 KG/M2

## 2019-06-16 DIAGNOSIS — J44.9 CHRONIC OBSTRUCTIVE PULMONARY DISEASE, UNSPECIFIED COPD TYPE (HCC): Primary | ICD-10-CM

## 2019-06-16 LAB
ALBUMIN SERPL-MCNC: 4.4 G/DL (ref 3.5–4.8)
ALBUMIN/GLOB SERPL: 1.9 G/DL (ref 1–1.7)
ALP SERPL-CCNC: 64 U/L (ref 32–91)
ALT SERPL W P-5'-P-CCNC: 22 U/L (ref 14–54)
ANION GAP SERPL CALCULATED.3IONS-SCNC: 12 MMOL/L (ref 10–20)
APTT PPP: 26.3 SECONDS (ref 24–31)
AST SERPL-CCNC: 16 U/L (ref 15–41)
BASOPHILS # BLD AUTO: 0 10*3/MM3 (ref 0–0.2)
BASOPHILS NFR BLD AUTO: 0.3 % (ref 0–1.5)
BILIRUB SERPL-MCNC: 0.7 MG/DL (ref 0.3–1.2)
BNP SERPL-MCNC: 73 PG/ML
BUN SERPL-MCNC: 8 MG/DL (ref 8–20)
BUN/CREAT SERPL: 11.4 (ref 5.4–26.2)
CALCIUM SPEC-SCNC: 9.5 MG/DL (ref 8.9–10.3)
CHLORIDE SERPL-SCNC: 101 MMOL/L (ref 101–111)
CO2 SERPL-SCNC: 28 MMOL/L (ref 22–32)
CREAT SERPL-MCNC: 0.7 MG/DL (ref 0.4–1)
DEPRECATED RDW RBC AUTO: 43.3 FL (ref 37–54)
EOSINOPHIL # BLD AUTO: 0.2 10*3/MM3 (ref 0–0.4)
EOSINOPHIL NFR BLD AUTO: 2.1 % (ref 0.3–6.2)
ERYTHROCYTE [DISTWIDTH] IN BLOOD BY AUTOMATED COUNT: 12.5 % (ref 12.3–15.4)
GFR SERPL CREATININE-BSD FRML MDRD: 83 ML/MIN/1.73
GLOBULIN UR ELPH-MCNC: 2.3 GM/DL (ref 2.5–3.8)
GLUCOSE SERPL-MCNC: 130 MG/DL (ref 65–99)
HCT VFR BLD AUTO: 44.7 % (ref 34–46.6)
HGB BLD-MCNC: 15.2 G/DL (ref 12–15.9)
INR PPP: 1.01 (ref 0.9–1.1)
LYMPHOCYTES # BLD AUTO: 2.3 10*3/MM3 (ref 0.7–3.1)
LYMPHOCYTES NFR BLD AUTO: 28.6 % (ref 19.6–45.3)
MCH RBC QN AUTO: 33.6 PG (ref 26.6–33)
MCHC RBC AUTO-ENTMCNC: 33.9 G/DL (ref 31.5–35.7)
MCV RBC AUTO: 99.3 FL (ref 79–97)
MONOCYTES # BLD AUTO: 0.6 10*3/MM3 (ref 0.1–0.9)
MONOCYTES NFR BLD AUTO: 7.6 % (ref 5–12)
NEUTROPHILS NFR BLD AUTO: 4.9 10*3/MM3 (ref 1.7–7)
NEUTROPHILS NFR BLD AUTO: 61.4 % (ref 42.7–76)
NRBC BLD AUTO-RTO: 0.1 /100 WBC (ref 0–0.2)
PLATELET # BLD AUTO: 317 10*3/MM3 (ref 140–450)
PMV BLD AUTO: 7.7 FL (ref 6–12)
POTASSIUM SERPL-SCNC: 3.4 MMOL/L (ref 3.6–5.1)
PROT SERPL-MCNC: 6.7 G/DL (ref 6.1–7.9)
PROTHROMBIN TIME: 10.4 SECONDS (ref 9.6–11.7)
RBC # BLD AUTO: 4.51 10*6/MM3 (ref 3.77–5.28)
SODIUM SERPL-SCNC: 141 MMOL/L (ref 136–144)
TROPONIN I SERPL-MCNC: <0.03 NG/ML (ref 0–0.03)
WBC NRBC COR # BLD: 8 10*3/MM3 (ref 3.4–10.8)

## 2019-06-16 PROCEDURE — 94640 AIRWAY INHALATION TREATMENT: CPT

## 2019-06-16 PROCEDURE — 99284 EMERGENCY DEPT VISIT MOD MDM: CPT

## 2019-06-16 PROCEDURE — 71045 X-RAY EXAM CHEST 1 VIEW: CPT

## 2019-06-16 PROCEDURE — 84484 ASSAY OF TROPONIN QUANT: CPT | Performed by: EMERGENCY MEDICINE

## 2019-06-16 PROCEDURE — 93005 ELECTROCARDIOGRAM TRACING: CPT | Performed by: EMERGENCY MEDICINE

## 2019-06-16 PROCEDURE — 80053 COMPREHEN METABOLIC PANEL: CPT | Performed by: EMERGENCY MEDICINE

## 2019-06-16 PROCEDURE — 85730 THROMBOPLASTIN TIME PARTIAL: CPT | Performed by: EMERGENCY MEDICINE

## 2019-06-16 PROCEDURE — 85610 PROTHROMBIN TIME: CPT | Performed by: EMERGENCY MEDICINE

## 2019-06-16 PROCEDURE — 85025 COMPLETE CBC W/AUTO DIFF WBC: CPT | Performed by: EMERGENCY MEDICINE

## 2019-06-16 PROCEDURE — 83880 ASSAY OF NATRIURETIC PEPTIDE: CPT | Performed by: EMERGENCY MEDICINE

## 2019-06-16 RX ORDER — METHYLPREDNISOLONE SODIUM SUCCINATE 125 MG/2ML
125 INJECTION, POWDER, LYOPHILIZED, FOR SOLUTION INTRAMUSCULAR; INTRAVENOUS ONCE
Status: DISCONTINUED | OUTPATIENT
Start: 2019-06-16 | End: 2019-06-16 | Stop reason: HOSPADM

## 2019-06-16 RX ORDER — PREDNISONE 20 MG/1
20 TABLET ORAL 2 TIMES DAILY
Qty: 10 TABLET | Refills: 0 | Status: SHIPPED | OUTPATIENT
Start: 2019-06-16 | End: 2019-10-29

## 2019-06-16 RX ORDER — IPRATROPIUM BROMIDE AND ALBUTEROL SULFATE 2.5; .5 MG/3ML; MG/3ML
3 SOLUTION RESPIRATORY (INHALATION) ONCE
Status: COMPLETED | OUTPATIENT
Start: 2019-06-16 | End: 2019-06-16

## 2019-06-16 RX ADMIN — IPRATROPIUM BROMIDE AND ALBUTEROL SULFATE 3 ML: .5; 3 SOLUTION RESPIRATORY (INHALATION) at 04:16

## 2019-06-26 NOTE — TELEPHONE ENCOUNTER
Phone Note   Outgoing Call  Summary of Call: New patient referral for R knee pain.  Call placed by: Gayla Coffman,  [place]  3, 2019 11:33 AM    Follow-up for Phone Call   Follow-up Details: LVM for return call for appt   Follow-up by: Sis Cruz MA,  June 26, 2019 10:10 AM            Electronically signed by Sis Cruz MA on 06/26/2019 at 10:11 AM  ________________________________________________________________________       Disclaimer: Converted Note message may not contain all data elements that existed in the [employer] source system. Please see [employer] for the original note details.

## 2019-07-05 RX ORDER — TRAZODONE HYDROCHLORIDE 100 MG/1
100 TABLET ORAL NIGHTLY
Qty: 90 TABLET | Refills: 1 | Status: SHIPPED | OUTPATIENT
Start: 2019-07-05 | End: 2020-09-24 | Stop reason: ALTCHOICE

## 2019-08-13 ENCOUNTER — OFFICE VISIT (OUTPATIENT)
Dept: PAIN MEDICINE | Facility: CLINIC | Age: 69
End: 2019-08-13

## 2019-08-13 VITALS
WEIGHT: 184 LBS | RESPIRATION RATE: 16 BRPM | OXYGEN SATURATION: 95 % | TEMPERATURE: 98.2 F | DIASTOLIC BLOOD PRESSURE: 85 MMHG | HEIGHT: 66 IN | HEART RATE: 76 BPM | SYSTOLIC BLOOD PRESSURE: 134 MMHG | BODY MASS INDEX: 29.57 KG/M2

## 2019-08-13 DIAGNOSIS — G89.29 CHRONIC PAIN OF LEFT KNEE: ICD-10-CM

## 2019-08-13 DIAGNOSIS — M25.512 CHRONIC LEFT SHOULDER PAIN: ICD-10-CM

## 2019-08-13 DIAGNOSIS — M47.817 LUMBOSACRAL SPONDYLOSIS WITHOUT MYELOPATHY: ICD-10-CM

## 2019-08-13 DIAGNOSIS — M54.2 NECK PAIN, CHRONIC: ICD-10-CM

## 2019-08-13 DIAGNOSIS — G89.29 NECK PAIN, CHRONIC: ICD-10-CM

## 2019-08-13 DIAGNOSIS — M47.812 CERVICAL SPONDYLOSIS WITHOUT MYELOPATHY: ICD-10-CM

## 2019-08-13 DIAGNOSIS — M53.3 SACROILIAC JOINT PAIN: ICD-10-CM

## 2019-08-13 DIAGNOSIS — G89.29 CHRONIC LEFT SHOULDER PAIN: ICD-10-CM

## 2019-08-13 DIAGNOSIS — G89.4 CHRONIC PAIN DISORDER: ICD-10-CM

## 2019-08-13 DIAGNOSIS — M17.12 PRIMARY OSTEOARTHRITIS OF LEFT KNEE: ICD-10-CM

## 2019-08-13 DIAGNOSIS — M48.061 SPINAL STENOSIS OF LUMBAR REGION, UNSPECIFIED WHETHER NEUROGENIC CLAUDICATION PRESENT: ICD-10-CM

## 2019-08-13 DIAGNOSIS — M25.562 CHRONIC PAIN OF LEFT KNEE: ICD-10-CM

## 2019-08-13 DIAGNOSIS — F19.90 CURRENT DRUG USE: Primary | ICD-10-CM

## 2019-08-13 DIAGNOSIS — M48.02 CERVICAL STENOSIS OF SPINAL CANAL: ICD-10-CM

## 2019-08-13 DIAGNOSIS — G89.29 CHRONIC BILATERAL LOW BACK PAIN, WITH SCIATICA PRESENCE UNSPECIFIED: Primary | ICD-10-CM

## 2019-08-13 DIAGNOSIS — M25.50 PAIN IN JOINT INVOLVING MULTIPLE SITES: ICD-10-CM

## 2019-08-13 DIAGNOSIS — M54.5 CHRONIC BILATERAL LOW BACK PAIN, WITH SCIATICA PRESENCE UNSPECIFIED: Primary | ICD-10-CM

## 2019-08-13 PROBLEM — M17.9 OSTEOARTHRITIS OF KNEE: Status: ACTIVE | Noted: 2019-06-03

## 2019-08-13 PROBLEM — M54.50 CHRONIC LOW BACK PAIN: Status: ACTIVE | Noted: 2018-08-23

## 2019-08-13 PROCEDURE — G0463 HOSPITAL OUTPT CLINIC VISIT: HCPCS | Performed by: PHYSICAL MEDICINE & REHABILITATION

## 2019-08-13 PROCEDURE — 99213 OFFICE O/P EST LOW 20 MIN: CPT | Performed by: PHYSICAL MEDICINE & REHABILITATION

## 2019-08-13 RX ORDER — OXYCODONE AND ACETAMINOPHEN 10; 325 MG/1; MG/1
1 TABLET ORAL EVERY 6 HOURS PRN
Qty: 120 TABLET | Refills: 0 | Status: SHIPPED | OUTPATIENT
Start: 2019-08-13 | End: 2019-08-13 | Stop reason: SDUPTHER

## 2019-08-13 RX ORDER — ATENOLOL 50 MG/1
TABLET ORAL EVERY 12 HOURS
COMMUNITY
Start: 2017-01-13 | End: 2019-10-29 | Stop reason: SDUPTHER

## 2019-08-13 RX ORDER — ONDANSETRON HYDROCHLORIDE 8 MG/1
TABLET, FILM COATED ORAL EVERY 12 HOURS
COMMUNITY
Start: 2017-07-25 | End: 2019-09-17 | Stop reason: SDUPTHER

## 2019-08-13 RX ORDER — FUROSEMIDE 40 MG/1
TABLET ORAL
COMMUNITY
Start: 2018-08-20 | End: 2019-10-29

## 2019-08-13 RX ORDER — ALPRAZOLAM 1 MG/1
TABLET ORAL
COMMUNITY
Start: 2013-03-18 | End: 2019-10-29 | Stop reason: SDUPTHER

## 2019-08-13 RX ORDER — TRAZODONE HYDROCHLORIDE 100 MG/1
TABLET ORAL EVERY 24 HOURS
COMMUNITY
Start: 2018-12-19 | End: 2019-10-29 | Stop reason: SDUPTHER

## 2019-08-13 RX ORDER — OXYCODONE AND ACETAMINOPHEN 10; 325 MG/1; MG/1
1 TABLET ORAL EVERY 6 HOURS PRN
Qty: 120 TABLET | Refills: 0 | Status: SHIPPED | OUTPATIENT
Start: 2019-08-13 | End: 2019-11-12 | Stop reason: SDUPTHER

## 2019-08-13 RX ORDER — OXYCODONE AND ACETAMINOPHEN 10; 325 MG/1; MG/1
1 TABLET ORAL EVERY 6 HOURS PRN
Refills: 0 | COMMUNITY
Start: 2019-07-16 | End: 2019-08-13 | Stop reason: SDUPTHER

## 2019-08-13 RX ORDER — ALBUTEROL SULFATE 2.5 MG/3ML
SOLUTION RESPIRATORY (INHALATION)
COMMUNITY
Start: 2016-12-05 | End: 2019-10-29 | Stop reason: SDUPTHER

## 2019-08-13 RX ORDER — POTASSIUM CHLORIDE 750 MG/1
TABLET, FILM COATED, EXTENDED RELEASE ORAL
COMMUNITY
Start: 2018-08-20 | End: 2019-10-29 | Stop reason: SDUPTHER

## 2019-08-13 NOTE — PROGRESS NOTES
"Subjective   Padmini Holt is a 68 y.o. female.     All over pain, 10/10 at worst, 7/10 at best, 8/10 today, always present, varies, aching, stabbing, worse with all activity, interferes with ADLs, sleep, quality of life, failed meds, injections, surgery, PT. Had multiple injections with Dr. Villalpando and Roddy. Imaging with R TKA, severe L knee OA, full thickness RTC tear. Prior notes reviewed, as abov, with referral for pain management, started on Norco 7.5/325mg BID, then TID, helps but not strong enough, then 10/325mg QID prn. Tried rotating to Percocet with headaches, stopped, restarted, doing well. Had 2 b/l Supartz injections. Poor sleep with pineal gland tumor, has failed \"everything.\"         The following portions of the patient's history were reviewed and updated as appropriate: allergies, current medications, past family history, past medical history, past social history, past surgical history and problem list.    Review of Systems   Constitutional: Positive for fatigue. Negative for chills and fever.   HENT: Positive for hearing loss. Negative for trouble swallowing.    Eyes: Positive for visual disturbance.   Respiratory: Positive for shortness of breath.    Cardiovascular: Positive for chest pain.   Gastrointestinal: Positive for abdominal pain and nausea. Negative for constipation, diarrhea and vomiting.   Genitourinary: Negative for urinary incontinence.   Musculoskeletal: Positive for arthralgias, back pain, joint swelling and neck pain. Negative for myalgias.   Neurological: Positive for dizziness and headache. Negative for weakness and numbness.       Objective   Physical Exam   Constitutional: She is oriented to person, place, and time. She appears well-developed and well-nourished.   HENT:   Head: Normocephalic and atraumatic.   Eyes: EOM are normal. Pupils are equal, round, and reactive to light.   Neck: Normal range of motion.   Cardiovascular: Normal rate, regular rhythm, normal heart sounds and " intact distal pulses.   Pulmonary/Chest: Breath sounds normal.   Abdominal: Soft. Bowel sounds are normal. She exhibits no distension. There is no tenderness.   Neurological: She is alert and oriented to person, place, and time. She has normal strength and normal reflexes. She displays normal reflexes. No sensory deficit.   Psychiatric: She has a normal mood and affect. Her behavior is normal. Thought content normal.         Assessment/Plan   Padmini was seen today for arthritis.    Diagnoses and all orders for this visit:    Chronic bilateral low back pain, with sciatica presence unspecified    Chronic pain disorder    Pain in joint involving multiple sites    Neck pain, chronic    Chronic left shoulder pain    Sacroiliac joint pain    Cervical spondylosis without myelopathy    Lumbosacral spondylosis without myelopathy    Primary osteoarthritis of left knee    Chronic pain of left knee    Spinal stenosis of lumbar region, unspecified whether neurogenic claudication present    Cervical stenosis of spinal canal        INspect in order. Mod risk per SOAPP. UDS 8/23/18 in order.  Increased to Norco 10/325mg TID prn, then QID, stop. Begin Percocet 10mg QID prn, was taking as high as Oxycodone 15mg TID in recent past.  Failed Diclofenac cream. Reordered RxAlt #1 cream.  Cont other meds as prescribed.  Referred to Rheum for prior dz of autoimmune disease.  Failed PT, surgery, injections.  Ordered  PT, artemio for other needs.  Had 2nd of 3 L knee Supartz injections, failed steroids in past. Is s/p R TKA.  May need to inject b/l CMC thumb joints.  Referral to Gayle Pate to artemio for revision of R TKA.  RTC in 3 months for f/u.

## 2019-08-19 NOTE — TELEPHONE ENCOUNTER
Patient was seeing psych. Please see if she is still seeing them. This refill should come from psych.

## 2019-08-21 RX ORDER — ALPRAZOLAM 1 MG/1
TABLET ORAL NIGHTLY PRN
OUTPATIENT
Start: 2019-08-21

## 2019-08-28 ENCOUNTER — TRANSCRIBE ORDERS (OUTPATIENT)
Dept: ADMINISTRATIVE | Facility: HOSPITAL | Age: 69
End: 2019-08-28

## 2019-08-28 DIAGNOSIS — M25.561 RIGHT KNEE PAIN, UNSPECIFIED CHRONICITY: Primary | ICD-10-CM

## 2019-09-05 ENCOUNTER — TRANSCRIBE ORDERS (OUTPATIENT)
Dept: ADMINISTRATIVE | Facility: HOSPITAL | Age: 69
End: 2019-09-05

## 2019-09-05 ENCOUNTER — HOSPITAL ENCOUNTER (OUTPATIENT)
Dept: NUCLEAR MEDICINE | Facility: HOSPITAL | Age: 69
Discharge: HOME | End: 2019-09-05

## 2019-09-05 ENCOUNTER — LAB (OUTPATIENT)
Dept: LAB | Facility: HOSPITAL | Age: 69
End: 2019-09-05

## 2019-09-05 DIAGNOSIS — M25.561 RIGHT KNEE PAIN, UNSPECIFIED CHRONICITY: ICD-10-CM

## 2019-09-05 DIAGNOSIS — M79.651 PAIN IN RIGHT THIGH: Primary | ICD-10-CM

## 2019-09-05 DIAGNOSIS — M79.651 PAIN IN RIGHT THIGH: ICD-10-CM

## 2019-09-05 LAB
BASOPHILS # BLD AUTO: 0 10*3/MM3 (ref 0–0.2)
BASOPHILS NFR BLD AUTO: 0.5 % (ref 0–1.5)
CRP SERPL-MCNC: 0.17 MG/DL (ref 0–0.7)
DEPRECATED RDW RBC AUTO: 44.2 FL (ref 37–54)
EOSINOPHIL # BLD AUTO: 0.2 10*3/MM3 (ref 0–0.4)
EOSINOPHIL NFR BLD AUTO: 2.4 % (ref 0.3–6.2)
ERYTHROCYTE [DISTWIDTH] IN BLOOD BY AUTOMATED COUNT: 12.9 % (ref 12.3–15.4)
ERYTHROCYTE [SEDIMENTATION RATE] IN BLOOD: 5 MM/HR (ref 0–30)
HCT VFR BLD AUTO: 43.5 % (ref 34–46.6)
HGB BLD-MCNC: 14.6 G/DL (ref 12–15.9)
LYMPHOCYTES # BLD AUTO: 2.6 10*3/MM3 (ref 0.7–3.1)
LYMPHOCYTES NFR BLD AUTO: 32.2 % (ref 19.6–45.3)
MCH RBC QN AUTO: 32.8 PG (ref 26.6–33)
MCHC RBC AUTO-ENTMCNC: 33.5 G/DL (ref 31.5–35.7)
MCV RBC AUTO: 98 FL (ref 79–97)
MONOCYTES # BLD AUTO: 0.8 10*3/MM3 (ref 0.1–0.9)
MONOCYTES NFR BLD AUTO: 10 % (ref 5–12)
NEUTROPHILS # BLD AUTO: 4.4 10*3/MM3 (ref 1.7–7)
NEUTROPHILS NFR BLD AUTO: 54.9 % (ref 42.7–76)
NRBC BLD AUTO-RTO: 0 /100 WBC (ref 0–0.2)
PLATELET # BLD AUTO: 294 10*3/MM3 (ref 140–450)
PMV BLD AUTO: 8.3 FL (ref 6–12)
RBC # BLD AUTO: 4.43 10*6/MM3 (ref 3.77–5.28)
WBC NRBC COR # BLD: 8 10*3/MM3 (ref 3.4–10.8)

## 2019-09-05 PROCEDURE — 85652 RBC SED RATE AUTOMATED: CPT

## 2019-09-05 PROCEDURE — 36415 COLL VENOUS BLD VENIPUNCTURE: CPT

## 2019-09-05 PROCEDURE — 85025 COMPLETE CBC W/AUTO DIFF WBC: CPT

## 2019-09-05 PROCEDURE — 78315 BONE IMAGING 3 PHASE: CPT

## 2019-09-05 PROCEDURE — A9503 TC99M MEDRONATE: HCPCS | Performed by: ORTHOPAEDIC SURGERY

## 2019-09-05 PROCEDURE — 86140 C-REACTIVE PROTEIN: CPT

## 2019-09-05 PROCEDURE — 0 TECHNETIUM MEDRONATE KIT: Performed by: ORTHOPAEDIC SURGERY

## 2019-09-05 RX ORDER — TC 99M MEDRONATE 20 MG/10ML
27.9 INJECTION, POWDER, LYOPHILIZED, FOR SOLUTION INTRAVENOUS
Status: COMPLETED | OUTPATIENT
Start: 2019-09-05 | End: 2019-09-05

## 2019-09-05 RX ADMIN — TC 99M MEDRONATE 27.9 MILLICURIE: 20 INJECTION, POWDER, LYOPHILIZED, FOR SOLUTION INTRAVENOUS at 09:45

## 2019-09-17 RX ORDER — ONDANSETRON HYDROCHLORIDE 8 MG/1
8 TABLET, FILM COATED ORAL EVERY 12 HOURS PRN
Qty: 30 TABLET | Refills: 2 | Status: ON HOLD | OUTPATIENT
Start: 2019-09-17 | End: 2019-10-29

## 2019-10-17 ENCOUNTER — OFFICE (OUTPATIENT)
Dept: URBAN - METROPOLITAN AREA CLINIC 64 | Facility: CLINIC | Age: 69
End: 2019-10-17

## 2019-10-17 VITALS
HEART RATE: 74 BPM | DIASTOLIC BLOOD PRESSURE: 111 MMHG | WEIGHT: 182 LBS | HEIGHT: 66 IN | SYSTOLIC BLOOD PRESSURE: 164 MMHG

## 2019-10-17 DIAGNOSIS — I10 ESSENTIAL (PRIMARY) HYPERTENSION: ICD-10-CM

## 2019-10-17 DIAGNOSIS — F41.9 ANXIETY DISORDER, UNSPECIFIED: ICD-10-CM

## 2019-10-17 DIAGNOSIS — Q45.3 OTHER CONGENITAL MALFORMATIONS OF PANCREAS AND PANCREATIC DU: ICD-10-CM

## 2019-10-17 DIAGNOSIS — K58.0 IRRITABLE BOWEL SYNDROME WITH DIARRHEA: ICD-10-CM

## 2019-10-17 DIAGNOSIS — M81.0 AGE-RELATED OSTEOPOROSIS WITHOUT CURRENT PATHOLOGICAL FRACTU: ICD-10-CM

## 2019-10-17 DIAGNOSIS — Z90.49 ACQUIRED ABSENCE OF OTHER SPECIFIED PARTS OF DIGESTIVE TRACT: ICD-10-CM

## 2019-10-17 PROCEDURE — 99203 OFFICE O/P NEW LOW 30 MIN: CPT | Performed by: INTERNAL MEDICINE

## 2019-10-17 RX ORDER — PRUCALOPRIDE 2 MG/1
2 TABLET, FILM COATED ORAL
Qty: 30 | Refills: 11 | Status: ACTIVE
Start: 2019-10-17

## 2019-10-29 ENCOUNTER — APPOINTMENT (OUTPATIENT)
Dept: GENERAL RADIOLOGY | Facility: HOSPITAL | Age: 69
End: 2019-10-29

## 2019-10-29 ENCOUNTER — HOSPITAL ENCOUNTER (OUTPATIENT)
Facility: HOSPITAL | Age: 69
Setting detail: OBSERVATION
Discharge: HOME | End: 2019-10-30
Attending: EMERGENCY MEDICINE | Admitting: INTERNAL MEDICINE

## 2019-10-29 DIAGNOSIS — J44.1 CHRONIC OBSTRUCTIVE PULMONARY DISEASE WITH ACUTE EXACERBATION (HCC): ICD-10-CM

## 2019-10-29 DIAGNOSIS — R06.03 ACUTE RESPIRATORY DISTRESS: Primary | ICD-10-CM

## 2019-10-29 PROBLEM — Z87.19 HISTORY OF GASTROINTESTINAL DISEASE: Status: ACTIVE | Noted: 2019-10-29

## 2019-10-29 PROBLEM — I67.89 OTHER CEREBROVASCULAR DISEASE: Status: ACTIVE | Noted: 2018-08-21

## 2019-10-29 PROBLEM — F43.12 CHRONIC POST-TRAUMATIC STRESS DISORDER (PTSD): Status: ACTIVE | Noted: 2018-02-06

## 2019-10-29 PROBLEM — Z86.79 HISTORY OF ATRIAL FIBRILLATION: Status: ACTIVE | Noted: 2019-10-29

## 2019-10-29 PROBLEM — G40.909 SEIZURE DISORDER (HCC): Status: ACTIVE | Noted: 2019-10-29

## 2019-10-29 PROBLEM — J44.89 ASTHMA WITH COPD: Status: ACTIVE | Noted: 2018-09-04

## 2019-10-29 PROBLEM — R59.0 HILAR LYMPHADENOPATHY: Status: ACTIVE | Noted: 2017-02-15

## 2019-10-29 PROBLEM — M79.7 FIBROMYOSITIS: Status: ACTIVE | Noted: 2018-05-22

## 2019-10-29 PROBLEM — G56.03 BILATERAL CARPAL TUNNEL SYNDROME: Status: ACTIVE | Noted: 2017-03-15

## 2019-10-29 PROBLEM — IMO0002 DEGENERATION OF INTERVERTEBRAL DISC: Status: ACTIVE | Noted: 2019-10-29

## 2019-10-29 PROBLEM — F41.8 ANXIETY WITH DEPRESSION: Status: ACTIVE | Noted: 2018-04-24

## 2019-10-29 PROBLEM — M19.90 OSTEOARTHRITIS: Status: ACTIVE | Noted: 2019-10-29

## 2019-10-29 PROBLEM — Z98.1 HISTORY OF LUMBAR FUSION: Status: ACTIVE | Noted: 2017-04-06

## 2019-10-29 PROBLEM — J44.9 ASTHMA WITH COPD (HCC): Status: ACTIVE | Noted: 2018-09-04

## 2019-10-29 PROBLEM — A49.02 METHICILLIN RESISTANT STAPHYLOCOCCUS AUREUS INFECTION: Status: ACTIVE | Noted: 2019-10-29

## 2019-10-29 PROBLEM — D32.0 INTRACRANIAL MENINGIOMA (HCC): Status: ACTIVE | Noted: 2019-01-14

## 2019-10-29 PROBLEM — K21.9 GASTROESOPHAGEAL REFLUX DISEASE: Status: ACTIVE | Noted: 2018-05-10

## 2019-10-29 LAB
ALBUMIN SERPL-MCNC: 4.7 G/DL (ref 3.5–5.2)
ALBUMIN/GLOB SERPL: 1.6 G/DL
ALP SERPL-CCNC: 92 U/L (ref 39–117)
ALT SERPL W P-5'-P-CCNC: 20 U/L (ref 1–33)
ANION GAP SERPL CALCULATED.3IONS-SCNC: 12 MMOL/L (ref 5–15)
AST SERPL-CCNC: 15 U/L (ref 1–32)
B PERT DNA SPEC QL NAA+PROBE: NOT DETECTED
BASOPHILS # BLD AUTO: 0.1 10*3/MM3 (ref 0–0.2)
BASOPHILS NFR BLD AUTO: 0.7 % (ref 0–1.5)
BILIRUB SERPL-MCNC: 0.3 MG/DL (ref 0.2–1.2)
BILIRUB UR QL STRIP: NEGATIVE
BUN BLD-MCNC: 6 MG/DL (ref 8–23)
BUN/CREAT SERPL: 8.7 (ref 7–25)
C PNEUM DNA NPH QL NAA+NON-PROBE: NOT DETECTED
CALCIUM SPEC-SCNC: 9.6 MG/DL (ref 8.6–10.5)
CHLORIDE SERPL-SCNC: 101 MMOL/L (ref 98–107)
CLARITY UR: CLEAR
CO2 SERPL-SCNC: 28 MMOL/L (ref 22–29)
COLOR UR: YELLOW
CREAT BLD-MCNC: 0.69 MG/DL (ref 0.57–1)
DEPRECATED RDW RBC AUTO: 43.3 FL (ref 37–54)
EOSINOPHIL # BLD AUTO: 0.2 10*3/MM3 (ref 0–0.4)
EOSINOPHIL NFR BLD AUTO: 1.6 % (ref 0.3–6.2)
ERYTHROCYTE [DISTWIDTH] IN BLOOD BY AUTOMATED COUNT: 12.6 % (ref 12.3–15.4)
FLUAV H1 2009 PAND RNA NPH QL NAA+PROBE: NOT DETECTED
FLUAV H1 HA GENE NPH QL NAA+PROBE: NOT DETECTED
FLUAV H3 RNA NPH QL NAA+PROBE: NOT DETECTED
FLUAV SUBTYP SPEC NAA+PROBE: NOT DETECTED
FLUBV RNA ISLT QL NAA+PROBE: NOT DETECTED
GFR SERPL CREATININE-BSD FRML MDRD: 84 ML/MIN/1.73
GLOBULIN UR ELPH-MCNC: 3 GM/DL
GLUCOSE BLD-MCNC: 116 MG/DL (ref 65–99)
GLUCOSE BLDC GLUCOMTR-MCNC: 139 MG/DL (ref 70–105)
GLUCOSE BLDC GLUCOMTR-MCNC: 288 MG/DL (ref 70–105)
GLUCOSE UR STRIP-MCNC: NEGATIVE MG/DL
HADV DNA SPEC NAA+PROBE: NOT DETECTED
HCOV 229E RNA SPEC QL NAA+PROBE: NOT DETECTED
HCOV HKU1 RNA SPEC QL NAA+PROBE: NOT DETECTED
HCOV NL63 RNA SPEC QL NAA+PROBE: NOT DETECTED
HCOV OC43 RNA SPEC QL NAA+PROBE: NOT DETECTED
HCT VFR BLD AUTO: 45.8 % (ref 34–46.6)
HGB BLD-MCNC: 15.5 G/DL (ref 12–15.9)
HGB UR QL STRIP.AUTO: NEGATIVE
HMPV RNA NPH QL NAA+NON-PROBE: NOT DETECTED
HPIV1 RNA SPEC QL NAA+PROBE: NOT DETECTED
HPIV2 RNA SPEC QL NAA+PROBE: NOT DETECTED
HPIV3 RNA NPH QL NAA+PROBE: NOT DETECTED
HPIV4 P GENE NPH QL NAA+PROBE: NOT DETECTED
KETONES UR QL STRIP: NEGATIVE
LEUKOCYTE ESTERASE UR QL STRIP.AUTO: NEGATIVE
LYMPHOCYTES # BLD AUTO: 2.8 10*3/MM3 (ref 0.7–3.1)
LYMPHOCYTES NFR BLD AUTO: 27 % (ref 19.6–45.3)
M PNEUMO IGG SER IA-ACNC: NOT DETECTED
MCH RBC QN AUTO: 33.1 PG (ref 26.6–33)
MCHC RBC AUTO-ENTMCNC: 33.9 G/DL (ref 31.5–35.7)
MCV RBC AUTO: 97.9 FL (ref 79–97)
MONOCYTES # BLD AUTO: 0.9 10*3/MM3 (ref 0.1–0.9)
MONOCYTES NFR BLD AUTO: 9.2 % (ref 5–12)
NEUTROPHILS # BLD AUTO: 6.3 10*3/MM3 (ref 1.7–7)
NEUTROPHILS NFR BLD AUTO: 61.5 % (ref 42.7–76)
NITRITE UR QL STRIP: NEGATIVE
NRBC BLD AUTO-RTO: 0 /100 WBC (ref 0–0.2)
NT-PROBNP SERPL-MCNC: 167.8 PG/ML (ref 5–900)
PH UR STRIP.AUTO: 7 [PH] (ref 5–8)
PLATELET # BLD AUTO: 302 10*3/MM3 (ref 140–450)
PMV BLD AUTO: 8 FL (ref 6–12)
POTASSIUM BLD-SCNC: 4.3 MMOL/L (ref 3.5–5.2)
PROCALCITONIN SERPL-MCNC: <0.02 NG/ML (ref 0.1–0.25)
PROT SERPL-MCNC: 7.7 G/DL (ref 6–8.5)
PROT UR QL STRIP: NEGATIVE
RBC # BLD AUTO: 4.68 10*6/MM3 (ref 3.77–5.28)
RHINOVIRUS RNA SPEC NAA+PROBE: DETECTED
RSV RNA NPH QL NAA+NON-PROBE: NOT DETECTED
SODIUM BLD-SCNC: 141 MMOL/L (ref 136–145)
SP GR UR STRIP: 1.01 (ref 1–1.03)
TROPONIN T SERPL-MCNC: <0.01 NG/ML (ref 0–0.03)
TROPONIN T SERPL-MCNC: <0.01 NG/ML (ref 0–0.03)
UROBILINOGEN UR QL STRIP: NORMAL
WBC NRBC COR # BLD: 10.2 10*3/MM3 (ref 3.4–10.8)

## 2019-10-29 PROCEDURE — 96372 THER/PROPH/DIAG INJ SC/IM: CPT

## 2019-10-29 PROCEDURE — 99220 PR INITIAL OBSERVATION CARE/DAY 70 MINUTES: CPT | Performed by: INTERNAL MEDICINE

## 2019-10-29 PROCEDURE — 85025 COMPLETE CBC W/AUTO DIFF WBC: CPT | Performed by: EMERGENCY MEDICINE

## 2019-10-29 PROCEDURE — 71045 X-RAY EXAM CHEST 1 VIEW: CPT

## 2019-10-29 PROCEDURE — G0378 HOSPITAL OBSERVATION PER HR: HCPCS

## 2019-10-29 PROCEDURE — 93005 ELECTROCARDIOGRAM TRACING: CPT | Performed by: EMERGENCY MEDICINE

## 2019-10-29 PROCEDURE — 84145 PROCALCITONIN (PCT): CPT | Performed by: PHYSICIAN ASSISTANT

## 2019-10-29 PROCEDURE — 81003 URINALYSIS AUTO W/O SCOPE: CPT | Performed by: EMERGENCY MEDICINE

## 2019-10-29 PROCEDURE — 94760 N-INVAS EAR/PLS OXIMETRY 1: CPT

## 2019-10-29 PROCEDURE — 96365 THER/PROPH/DIAG IV INF INIT: CPT

## 2019-10-29 PROCEDURE — 96375 TX/PRO/DX INJ NEW DRUG ADDON: CPT

## 2019-10-29 PROCEDURE — 25010000002 METHYLPREDNISOLONE PER 125 MG: Performed by: EMERGENCY MEDICINE

## 2019-10-29 PROCEDURE — 94799 UNLISTED PULMONARY SVC/PX: CPT

## 2019-10-29 PROCEDURE — 99284 EMERGENCY DEPT VISIT MOD MDM: CPT

## 2019-10-29 PROCEDURE — 82962 GLUCOSE BLOOD TEST: CPT

## 2019-10-29 PROCEDURE — 80053 COMPREHEN METABOLIC PANEL: CPT | Performed by: EMERGENCY MEDICINE

## 2019-10-29 PROCEDURE — 84484 ASSAY OF TROPONIN QUANT: CPT | Performed by: EMERGENCY MEDICINE

## 2019-10-29 PROCEDURE — 25010000002 ENOXAPARIN PER 10 MG: Performed by: PHYSICIAN ASSISTANT

## 2019-10-29 PROCEDURE — 63710000001 INSULIN LISPRO (HUMAN) PER 5 UNITS: Performed by: PHYSICIAN ASSISTANT

## 2019-10-29 PROCEDURE — 0099U HC BIOFIRE FILMARRAY RESP PANEL 1: CPT | Performed by: EMERGENCY MEDICINE

## 2019-10-29 PROCEDURE — 94640 AIRWAY INHALATION TREATMENT: CPT

## 2019-10-29 PROCEDURE — 83880 ASSAY OF NATRIURETIC PEPTIDE: CPT | Performed by: EMERGENCY MEDICINE

## 2019-10-29 PROCEDURE — 84484 ASSAY OF TROPONIN QUANT: CPT | Performed by: PHYSICIAN ASSISTANT

## 2019-10-29 RX ORDER — ACETAMINOPHEN 325 MG/1
650 TABLET ORAL EVERY 4 HOURS PRN
Status: DISCONTINUED | OUTPATIENT
Start: 2019-10-29 | End: 2019-10-30 | Stop reason: HOSPADM

## 2019-10-29 RX ORDER — DOCUSATE SODIUM 100 MG/1
100 CAPSULE, LIQUID FILLED ORAL 2 TIMES DAILY PRN
Status: DISCONTINUED | OUTPATIENT
Start: 2019-10-29 | End: 2019-10-30 | Stop reason: HOSPADM

## 2019-10-29 RX ORDER — IPRATROPIUM BROMIDE AND ALBUTEROL SULFATE 2.5; .5 MG/3ML; MG/3ML
3 SOLUTION RESPIRATORY (INHALATION)
Status: DISCONTINUED | OUTPATIENT
Start: 2019-10-29 | End: 2019-10-30 | Stop reason: HOSPADM

## 2019-10-29 RX ORDER — BISACODYL 10 MG
10 SUPPOSITORY, RECTAL RECTAL DAILY PRN
Status: DISCONTINUED | OUTPATIENT
Start: 2019-10-29 | End: 2019-10-30 | Stop reason: HOSPADM

## 2019-10-29 RX ORDER — ALPRAZOLAM 1 MG/1
1 TABLET ORAL NIGHTLY PRN
Status: DISCONTINUED | OUTPATIENT
Start: 2019-10-29 | End: 2019-10-30 | Stop reason: HOSPADM

## 2019-10-29 RX ORDER — PREDNISONE 20 MG/1
40 TABLET ORAL
Status: DISCONTINUED | OUTPATIENT
Start: 2019-10-30 | End: 2019-10-30 | Stop reason: HOSPADM

## 2019-10-29 RX ORDER — SODIUM CHLORIDE 0.9 % (FLUSH) 0.9 %
10 SYRINGE (ML) INJECTION AS NEEDED
Status: DISCONTINUED | OUTPATIENT
Start: 2019-10-29 | End: 2019-10-30 | Stop reason: HOSPADM

## 2019-10-29 RX ORDER — ALUMINA, MAGNESIA, AND SIMETHICONE 2400; 2400; 240 MG/30ML; MG/30ML; MG/30ML
15 SUSPENSION ORAL EVERY 6 HOURS PRN
Status: DISCONTINUED | OUTPATIENT
Start: 2019-10-29 | End: 2019-10-30 | Stop reason: HOSPADM

## 2019-10-29 RX ORDER — ACETAMINOPHEN 650 MG/1
650 SUPPOSITORY RECTAL EVERY 4 HOURS PRN
Status: DISCONTINUED | OUTPATIENT
Start: 2019-10-29 | End: 2019-10-30 | Stop reason: HOSPADM

## 2019-10-29 RX ORDER — ACETAMINOPHEN 160 MG/5ML
650 SOLUTION ORAL EVERY 4 HOURS PRN
Status: DISCONTINUED | OUTPATIENT
Start: 2019-10-29 | End: 2019-10-30 | Stop reason: HOSPADM

## 2019-10-29 RX ORDER — ONDANSETRON 4 MG/1
8 TABLET, FILM COATED ORAL EVERY 12 HOURS PRN
Status: DISCONTINUED | OUTPATIENT
Start: 2019-10-29 | End: 2019-10-30 | Stop reason: HOSPADM

## 2019-10-29 RX ORDER — MAGNESIUM SULFATE 1 G/100ML
1 INJECTION INTRAVENOUS AS NEEDED
Status: DISCONTINUED | OUTPATIENT
Start: 2019-10-29 | End: 2019-10-30 | Stop reason: HOSPADM

## 2019-10-29 RX ORDER — SODIUM CHLORIDE 0.9 % (FLUSH) 0.9 %
10 SYRINGE (ML) INJECTION EVERY 12 HOURS SCHEDULED
Status: DISCONTINUED | OUTPATIENT
Start: 2019-10-29 | End: 2019-10-30 | Stop reason: HOSPADM

## 2019-10-29 RX ORDER — METHYLPREDNISOLONE SODIUM SUCCINATE 125 MG/2ML
125 INJECTION, POWDER, LYOPHILIZED, FOR SOLUTION INTRAMUSCULAR; INTRAVENOUS ONCE
Status: COMPLETED | OUTPATIENT
Start: 2019-10-29 | End: 2019-10-29

## 2019-10-29 RX ORDER — DEXTROSE MONOHYDRATE 25 G/50ML
25 INJECTION, SOLUTION INTRAVENOUS
Status: DISCONTINUED | OUTPATIENT
Start: 2019-10-29 | End: 2019-10-30 | Stop reason: HOSPADM

## 2019-10-29 RX ORDER — CALCIUM CARBONATE 200(500)MG
2 TABLET,CHEWABLE ORAL 2 TIMES DAILY PRN
Status: DISCONTINUED | OUTPATIENT
Start: 2019-10-29 | End: 2019-10-30 | Stop reason: HOSPADM

## 2019-10-29 RX ORDER — DIPHENHYDRAMINE HCL 25 MG
25 TABLET ORAL NIGHTLY PRN
Status: DISCONTINUED | OUTPATIENT
Start: 2019-10-29 | End: 2019-10-30 | Stop reason: HOSPADM

## 2019-10-29 RX ORDER — ATENOLOL 50 MG/1
50 TABLET ORAL 2 TIMES DAILY
Status: DISCONTINUED | OUTPATIENT
Start: 2019-10-29 | End: 2019-10-30 | Stop reason: HOSPADM

## 2019-10-29 RX ORDER — POTASSIUM CHLORIDE 1.5 G/1.77G
40 POWDER, FOR SOLUTION ORAL AS NEEDED
Status: DISCONTINUED | OUTPATIENT
Start: 2019-10-29 | End: 2019-10-30 | Stop reason: HOSPADM

## 2019-10-29 RX ORDER — POTASSIUM CHLORIDE 20 MEQ/1
40 TABLET, EXTENDED RELEASE ORAL AS NEEDED
Status: DISCONTINUED | OUTPATIENT
Start: 2019-10-29 | End: 2019-10-30 | Stop reason: HOSPADM

## 2019-10-29 RX ORDER — ONDANSETRON 2 MG/ML
4 INJECTION INTRAMUSCULAR; INTRAVENOUS EVERY 6 HOURS PRN
Status: DISCONTINUED | OUTPATIENT
Start: 2019-10-29 | End: 2019-10-30 | Stop reason: HOSPADM

## 2019-10-29 RX ORDER — ALBUTEROL SULFATE 2.5 MG/3ML
2.5 SOLUTION RESPIRATORY (INHALATION) ONCE
Status: COMPLETED | OUTPATIENT
Start: 2019-10-29 | End: 2019-10-29

## 2019-10-29 RX ORDER — NICOTINE POLACRILEX 4 MG
15 LOZENGE BUCCAL
Status: DISCONTINUED | OUTPATIENT
Start: 2019-10-29 | End: 2019-10-30 | Stop reason: HOSPADM

## 2019-10-29 RX ORDER — BUDESONIDE AND FORMOTEROL FUMARATE DIHYDRATE 160; 4.5 UG/1; UG/1
2 AEROSOL RESPIRATORY (INHALATION)
Status: DISCONTINUED | OUTPATIENT
Start: 2019-10-29 | End: 2019-10-30 | Stop reason: HOSPADM

## 2019-10-29 RX ORDER — ONDANSETRON 4 MG/1
4 TABLET, FILM COATED ORAL EVERY 6 HOURS PRN
Status: DISCONTINUED | OUTPATIENT
Start: 2019-10-29 | End: 2019-10-30 | Stop reason: HOSPADM

## 2019-10-29 RX ORDER — TRAZODONE HYDROCHLORIDE 50 MG/1
100 TABLET ORAL NIGHTLY
Status: DISCONTINUED | OUTPATIENT
Start: 2019-10-29 | End: 2019-10-30 | Stop reason: HOSPADM

## 2019-10-29 RX ORDER — FUROSEMIDE 40 MG/1
40 TABLET ORAL 2 TIMES DAILY PRN
Status: DISCONTINUED | OUTPATIENT
Start: 2019-10-29 | End: 2019-10-30 | Stop reason: HOSPADM

## 2019-10-29 RX ORDER — MAGNESIUM SULFATE HEPTAHYDRATE 40 MG/ML
2 INJECTION, SOLUTION INTRAVENOUS AS NEEDED
Status: DISCONTINUED | OUTPATIENT
Start: 2019-10-29 | End: 2019-10-30 | Stop reason: HOSPADM

## 2019-10-29 RX ORDER — IPRATROPIUM BROMIDE AND ALBUTEROL SULFATE 2.5; .5 MG/3ML; MG/3ML
3 SOLUTION RESPIRATORY (INHALATION) ONCE
Status: COMPLETED | OUTPATIENT
Start: 2019-10-29 | End: 2019-10-29

## 2019-10-29 RX ADMIN — IPRATROPIUM BROMIDE AND ALBUTEROL SULFATE 3 ML: .5; 3 SOLUTION RESPIRATORY (INHALATION) at 19:32

## 2019-10-29 RX ADMIN — ACETAMINOPHEN 650 MG: 325 TABLET ORAL at 21:13

## 2019-10-29 RX ADMIN — ENOXAPARIN SODIUM 40 MG: 40 INJECTION SUBCUTANEOUS at 17:36

## 2019-10-29 RX ADMIN — TRAZODONE HYDROCHLORIDE 100 MG: 50 TABLET ORAL at 21:00

## 2019-10-29 RX ADMIN — IPRATROPIUM BROMIDE AND ALBUTEROL SULFATE 3 ML: .5; 3 SOLUTION RESPIRATORY (INHALATION) at 12:55

## 2019-10-29 RX ADMIN — ATENOLOL 50 MG: 50 TABLET ORAL at 21:00

## 2019-10-29 RX ADMIN — ALPRAZOLAM 1 MG: 1 TABLET ORAL at 21:10

## 2019-10-29 RX ADMIN — DOXYCYCLINE 100 MG: 100 INJECTION, POWDER, LYOPHILIZED, FOR SOLUTION INTRAVENOUS at 14:50

## 2019-10-29 RX ADMIN — ALBUTEROL SULFATE 2.5 MG: 2.5 SOLUTION RESPIRATORY (INHALATION) at 14:28

## 2019-10-29 RX ADMIN — BUDESONIDE AND FORMOTEROL FUMARATE DIHYDRATE 2 PUFF: 160; 4.5 AEROSOL RESPIRATORY (INHALATION) at 19:34

## 2019-10-29 RX ADMIN — INSULIN LISPRO 4 UNITS: 100 INJECTION, SOLUTION INTRAVENOUS; SUBCUTANEOUS at 21:01

## 2019-10-29 RX ADMIN — Medication 10 ML: at 21:02

## 2019-10-29 RX ADMIN — ALBUTEROL SULFATE 2.5 MG: 2.5 SOLUTION RESPIRATORY (INHALATION) at 12:55

## 2019-10-29 RX ADMIN — METHYLPREDNISOLONE SODIUM SUCCINATE 125 MG: 125 INJECTION, POWDER, FOR SOLUTION INTRAMUSCULAR; INTRAVENOUS at 13:12

## 2019-10-30 VITALS
TEMPERATURE: 98.3 F | HEIGHT: 66 IN | RESPIRATION RATE: 18 BRPM | SYSTOLIC BLOOD PRESSURE: 119 MMHG | WEIGHT: 183.64 LBS | DIASTOLIC BLOOD PRESSURE: 77 MMHG | OXYGEN SATURATION: 92 % | BODY MASS INDEX: 29.51 KG/M2 | HEART RATE: 75 BPM

## 2019-10-30 LAB
ANION GAP SERPL CALCULATED.3IONS-SCNC: 10 MMOL/L (ref 5–15)
BASOPHILS # BLD AUTO: 0 10*3/MM3 (ref 0–0.2)
BASOPHILS NFR BLD AUTO: 0.3 % (ref 0–1.5)
BUN BLD-MCNC: 9 MG/DL (ref 8–23)
BUN/CREAT SERPL: 18.4 (ref 7–25)
CALCIUM SPEC-SCNC: 9 MG/DL (ref 8.6–10.5)
CHLORIDE SERPL-SCNC: 106 MMOL/L (ref 98–107)
CO2 SERPL-SCNC: 27 MMOL/L (ref 22–29)
CREAT BLD-MCNC: 0.49 MG/DL (ref 0.57–1)
DEPRECATED RDW RBC AUTO: 43.3 FL (ref 37–54)
EOSINOPHIL # BLD AUTO: 0 10*3/MM3 (ref 0–0.4)
EOSINOPHIL NFR BLD AUTO: 0.1 % (ref 0.3–6.2)
ERYTHROCYTE [DISTWIDTH] IN BLOOD BY AUTOMATED COUNT: 12.7 % (ref 12.3–15.4)
GFR SERPL CREATININE-BSD FRML MDRD: 125 ML/MIN/1.73
GLUCOSE BLD-MCNC: 119 MG/DL (ref 65–99)
GLUCOSE BLDC GLUCOMTR-MCNC: 149 MG/DL (ref 70–105)
GLUCOSE BLDC GLUCOMTR-MCNC: 94 MG/DL (ref 70–105)
HCT VFR BLD AUTO: 40 % (ref 34–46.6)
HGB BLD-MCNC: 13.9 G/DL (ref 12–15.9)
LYMPHOCYTES # BLD AUTO: 1.7 10*3/MM3 (ref 0.7–3.1)
LYMPHOCYTES NFR BLD AUTO: 16.7 % (ref 19.6–45.3)
MCH RBC QN AUTO: 33.9 PG (ref 26.6–33)
MCHC RBC AUTO-ENTMCNC: 34.7 G/DL (ref 31.5–35.7)
MCV RBC AUTO: 97.9 FL (ref 79–97)
MONOCYTES # BLD AUTO: 0.9 10*3/MM3 (ref 0.1–0.9)
MONOCYTES NFR BLD AUTO: 9.2 % (ref 5–12)
NEUTROPHILS # BLD AUTO: 7.3 10*3/MM3 (ref 1.7–7)
NEUTROPHILS NFR BLD AUTO: 73.7 % (ref 42.7–76)
NRBC BLD AUTO-RTO: 0 /100 WBC (ref 0–0.2)
PLATELET # BLD AUTO: 287 10*3/MM3 (ref 140–450)
PMV BLD AUTO: 8.3 FL (ref 6–12)
POTASSIUM BLD-SCNC: 3.9 MMOL/L (ref 3.5–5.2)
RBC # BLD AUTO: 4.09 10*6/MM3 (ref 3.77–5.28)
SODIUM BLD-SCNC: 143 MMOL/L (ref 136–145)
TROPONIN T SERPL-MCNC: <0.01 NG/ML (ref 0–0.03)
TROPONIN T SERPL-MCNC: <0.01 NG/ML (ref 0–0.03)
WBC NRBC COR # BLD: 10 10*3/MM3 (ref 3.4–10.8)

## 2019-10-30 PROCEDURE — 96366 THER/PROPH/DIAG IV INF ADDON: CPT

## 2019-10-30 PROCEDURE — G0378 HOSPITAL OBSERVATION PER HR: HCPCS

## 2019-10-30 PROCEDURE — 85025 COMPLETE CBC W/AUTO DIFF WBC: CPT | Performed by: PHYSICIAN ASSISTANT

## 2019-10-30 PROCEDURE — 82962 GLUCOSE BLOOD TEST: CPT

## 2019-10-30 PROCEDURE — 63710000001 PREDNISONE PER 1 MG: Performed by: PHYSICIAN ASSISTANT

## 2019-10-30 PROCEDURE — 94799 UNLISTED PULMONARY SVC/PX: CPT

## 2019-10-30 PROCEDURE — 99217 PR OBSERVATION CARE DISCHARGE MANAGEMENT: CPT | Performed by: INTERNAL MEDICINE

## 2019-10-30 PROCEDURE — 80048 BASIC METABOLIC PNL TOTAL CA: CPT | Performed by: PHYSICIAN ASSISTANT

## 2019-10-30 PROCEDURE — 84484 ASSAY OF TROPONIN QUANT: CPT | Performed by: PHYSICIAN ASSISTANT

## 2019-10-30 RX ORDER — BUDESONIDE AND FORMOTEROL FUMARATE DIHYDRATE 160; 4.5 UG/1; UG/1
2 AEROSOL RESPIRATORY (INHALATION) 2 TIMES DAILY
Qty: 6 G | Refills: 0 | Status: SHIPPED | OUTPATIENT
Start: 2019-10-30 | End: 2019-12-12

## 2019-10-30 RX ORDER — PREDNISONE 20 MG/1
40 TABLET ORAL DAILY
Qty: 8 TABLET | Refills: 0 | Status: SHIPPED | OUTPATIENT
Start: 2019-10-30 | End: 2019-11-03

## 2019-10-30 RX ORDER — HYDROXYZINE HYDROCHLORIDE 25 MG/1
25 TABLET, FILM COATED ORAL ONCE
Status: DISCONTINUED | OUTPATIENT
Start: 2019-10-30 | End: 2019-10-30 | Stop reason: HOSPADM

## 2019-10-30 RX ADMIN — Medication 10 ML: at 08:26

## 2019-10-30 RX ADMIN — IPRATROPIUM BROMIDE AND ALBUTEROL SULFATE 3 ML: .5; 3 SOLUTION RESPIRATORY (INHALATION) at 07:27

## 2019-10-30 RX ADMIN — BUDESONIDE AND FORMOTEROL FUMARATE DIHYDRATE 2 PUFF: 160; 4.5 AEROSOL RESPIRATORY (INHALATION) at 07:27

## 2019-10-30 RX ADMIN — PREDNISONE 40 MG: 20 TABLET ORAL at 08:25

## 2019-10-30 RX ADMIN — ALPRAZOLAM 1 MG: 1 TABLET ORAL at 06:09

## 2019-10-30 RX ADMIN — DOXYCYCLINE 100 MG: 100 INJECTION, POWDER, LYOPHILIZED, FOR SOLUTION INTRAVENOUS at 02:18

## 2019-10-30 RX ADMIN — ATENOLOL 50 MG: 50 TABLET ORAL at 08:25

## 2019-10-30 RX ADMIN — IPRATROPIUM BROMIDE AND ALBUTEROL SULFATE 3 ML: .5; 3 SOLUTION RESPIRATORY (INHALATION) at 15:33

## 2019-10-30 RX ADMIN — IPRATROPIUM BROMIDE AND ALBUTEROL SULFATE 3 ML: .5; 3 SOLUTION RESPIRATORY (INHALATION) at 11:52

## 2019-10-31 ENCOUNTER — READMISSION MANAGEMENT (OUTPATIENT)
Dept: CALL CENTER | Facility: HOSPITAL | Age: 69
End: 2019-10-31

## 2019-10-31 ENCOUNTER — OFFICE VISIT (OUTPATIENT)
Dept: FAMILY MEDICINE CLINIC | Facility: CLINIC | Age: 69
End: 2019-10-31

## 2019-10-31 VITALS
WEIGHT: 179.8 LBS | DIASTOLIC BLOOD PRESSURE: 84 MMHG | BODY MASS INDEX: 28.9 KG/M2 | OXYGEN SATURATION: 93 % | HEART RATE: 68 BPM | SYSTOLIC BLOOD PRESSURE: 152 MMHG | TEMPERATURE: 98.7 F | HEIGHT: 66 IN

## 2019-10-31 DIAGNOSIS — Z11.59 NEED FOR HEPATITIS C SCREENING TEST: ICD-10-CM

## 2019-10-31 DIAGNOSIS — G47.00 INSOMNIA, UNSPECIFIED TYPE: ICD-10-CM

## 2019-10-31 DIAGNOSIS — J44.1 CHRONIC OBSTRUCTIVE PULMONARY DISEASE WITH ACUTE EXACERBATION (HCC): Primary | ICD-10-CM

## 2019-10-31 DIAGNOSIS — R73.9 ELEVATED BLOOD SUGAR LEVEL: ICD-10-CM

## 2019-10-31 DIAGNOSIS — I10 ESSENTIAL HYPERTENSION: ICD-10-CM

## 2019-10-31 DIAGNOSIS — Z09 HOSPITAL DISCHARGE FOLLOW-UP: ICD-10-CM

## 2019-10-31 LAB
HBA1C MFR BLD: 5.5 % (ref 3.5–5.6)
HCV AB SER DONR QL: NORMAL

## 2019-10-31 PROCEDURE — 36415 COLL VENOUS BLD VENIPUNCTURE: CPT | Performed by: FAMILY MEDICINE

## 2019-10-31 PROCEDURE — 86803 HEPATITIS C AB TEST: CPT | Performed by: FAMILY MEDICINE

## 2019-10-31 PROCEDURE — 99214 OFFICE O/P EST MOD 30 MIN: CPT | Performed by: FAMILY MEDICINE

## 2019-10-31 PROCEDURE — 83036 HEMOGLOBIN GLYCOSYLATED A1C: CPT | Performed by: FAMILY MEDICINE

## 2019-10-31 RX ORDER — AMLODIPINE BESYLATE 5 MG/1
5 TABLET ORAL
Qty: 30 TABLET | Refills: 0 | Status: SHIPPED | OUTPATIENT
Start: 2019-10-31 | End: 2019-11-21 | Stop reason: SDUPTHER

## 2019-10-31 RX ORDER — SULFAMETHOXAZOLE AND TRIMETHOPRIM 800; 160 MG/1; MG/1
1 TABLET ORAL 2 TIMES DAILY
Qty: 10 TABLET | Refills: 0 | Status: SHIPPED | OUTPATIENT
Start: 2019-10-31 | End: 2019-11-05

## 2019-10-31 NOTE — OUTREACH NOTE
Prep Survey      Responses   Facility patient discharged from?  [facility]   Is patient eligible?  Yes   Discharge diagnosis  COPD exacerbation   Does the patient have one of the following disease processes/diagnoses(primary or secondary)?  COPD/Pneumonia   Does the patient have Home health ordered?  No   Is there a DME ordered?  No   Prep survey completed?  Yes          Tabby Chen RN

## 2019-11-01 ENCOUNTER — READMISSION MANAGEMENT (OUTPATIENT)
Dept: CALL CENTER | Facility: HOSPITAL | Age: 69
End: 2019-11-01

## 2019-11-01 ENCOUNTER — TELEPHONE (OUTPATIENT)
Dept: FAMILY MEDICINE CLINIC | Facility: CLINIC | Age: 69
End: 2019-11-01

## 2019-11-01 DIAGNOSIS — J44.1 CHRONIC OBSTRUCTIVE PULMONARY DISEASE WITH ACUTE EXACERBATION (HCC): Primary | ICD-10-CM

## 2019-11-01 NOTE — OUTREACH NOTE
"COPD/PN Week 1 Survey      Responses   Facility patient discharged from?  [facility]   Does the patient have one of the following disease processes/diagnoses(primary or secondary)?  COPD/Pneumonia   Is there a successful TCM telephone encounter documented?  No   Was the primary reason for admission:  COPD exacerbation   Week 1 attempt successful?  No   Unsuccessful attempts  Attempt 1 [NO ANSWER, LEFT VM]      RETURNED CALL FROM PATIENT. PATIENT VOICES THAT PREDNISONE IS MAKING HER \"FEEL WEIRD.\" SIDE EFFECTS OF PREDNISONE EXPLAINED TO PATIENT. PATIENT ALSO STATES TOMORROW IS HER LAST DAY OF THIS MEDICATION, PATIENT VOICES NO OTHER NEEDS OR QUESTIONS,     Susan Rubio, PITER  "

## 2019-11-01 NOTE — TELEPHONE ENCOUNTER
Prescription for Incruse was sent to patient's pharmacy    Signature    Alessandra Pina MD  Family [facility]        This document has been electronically signed by Alessandra Pina MD on November 1, 2019 12:55 PM

## 2019-11-12 ENCOUNTER — OFFICE VISIT (OUTPATIENT)
Dept: PAIN MEDICINE | Facility: CLINIC | Age: 69
End: 2019-11-12

## 2019-11-12 VITALS
OXYGEN SATURATION: 96 % | WEIGHT: 173 LBS | SYSTOLIC BLOOD PRESSURE: 143 MMHG | TEMPERATURE: 98.1 F | BODY MASS INDEX: 27.8 KG/M2 | DIASTOLIC BLOOD PRESSURE: 77 MMHG | RESPIRATION RATE: 16 BRPM | HEART RATE: 70 BPM | HEIGHT: 66 IN

## 2019-11-12 DIAGNOSIS — M53.3 SACROILIAC JOINT PAIN: ICD-10-CM

## 2019-11-12 DIAGNOSIS — M48.02 CERVICAL STENOSIS OF SPINAL CANAL: ICD-10-CM

## 2019-11-12 DIAGNOSIS — M54.50 CHRONIC BILATERAL LOW BACK PAIN, UNSPECIFIED WHETHER SCIATICA PRESENT: Primary | ICD-10-CM

## 2019-11-12 DIAGNOSIS — G89.29 CHRONIC BILATERAL LOW BACK PAIN, UNSPECIFIED WHETHER SCIATICA PRESENT: Primary | ICD-10-CM

## 2019-11-12 DIAGNOSIS — M25.512 CHRONIC LEFT SHOULDER PAIN: ICD-10-CM

## 2019-11-12 DIAGNOSIS — M48.061 SPINAL STENOSIS OF LUMBAR REGION, UNSPECIFIED WHETHER NEUROGENIC CLAUDICATION PRESENT: ICD-10-CM

## 2019-11-12 DIAGNOSIS — M47.812 CERVICAL SPONDYLOSIS WITHOUT MYELOPATHY: ICD-10-CM

## 2019-11-12 DIAGNOSIS — G89.4 CHRONIC PAIN DISORDER: ICD-10-CM

## 2019-11-12 DIAGNOSIS — G89.29 CHRONIC LEFT SHOULDER PAIN: ICD-10-CM

## 2019-11-12 DIAGNOSIS — M79.7 FIBROMYOSITIS: ICD-10-CM

## 2019-11-12 DIAGNOSIS — G89.29 NECK PAIN, CHRONIC: ICD-10-CM

## 2019-11-12 DIAGNOSIS — M47.817 LUMBOSACRAL SPONDYLOSIS WITHOUT MYELOPATHY: ICD-10-CM

## 2019-11-12 DIAGNOSIS — M54.2 NECK PAIN, CHRONIC: ICD-10-CM

## 2019-11-12 PROCEDURE — 99213 OFFICE O/P EST LOW 20 MIN: CPT | Performed by: PHYSICAL MEDICINE & REHABILITATION

## 2019-11-12 PROCEDURE — G0463 HOSPITAL OUTPT CLINIC VISIT: HCPCS | Performed by: PHYSICAL MEDICINE & REHABILITATION

## 2019-11-12 RX ORDER — OXYCODONE AND ACETAMINOPHEN 10; 325 MG/1; MG/1
1 TABLET ORAL EVERY 6 HOURS PRN
Qty: 120 TABLET | Refills: 0 | Status: SHIPPED | OUTPATIENT
Start: 2019-11-12 | End: 2020-02-18

## 2019-11-12 RX ORDER — QUETIAPINE FUMARATE 200 MG/1
200 TABLET, FILM COATED ORAL
Refills: 0 | COMMUNITY
Start: 2019-11-01 | End: 2020-08-27 | Stop reason: ALTCHOICE

## 2019-11-12 RX ORDER — OXYCODONE AND ACETAMINOPHEN 10; 325 MG/1; MG/1
1 TABLET ORAL EVERY 6 HOURS PRN
Qty: 120 TABLET | Refills: 0 | Status: SHIPPED | OUTPATIENT
Start: 2019-11-12 | End: 2019-11-12 | Stop reason: SDUPTHER

## 2019-11-12 NOTE — PROGRESS NOTES
"Subjective   Padmini Holt is a 69 y.o. female.     All over pain, 10/10 at worst, 7/10 at best, 8/10 today, always present, varies, aching, stabbing, worse with all activity, interferes with ADLs, sleep, quality of life, failed meds, injections, surgery, PT. Had multiple injections with Dr. Villalpando and Roddy. Imaging with R TKA, severe L knee OA, full thickness RTC tear. Prior notes reviewed, as abov, with referral for pain management, started on Norco 7.5/325mg BID, then TID, helps but not strong enough, then 10/325mg QID prn. Tried rotating to Percocet with headaches, stopped, restarted, doing well. Had 2 b/l Supartz injections. Poor sleep with pineal gland tumor, has failed \"everything.\"         The following portions of the patient's history were reviewed and updated as appropriate: allergies, current medications, past family history, past medical history, past social history, past surgical history and problem list.    Review of Systems   Constitutional: Positive for fatigue. Negative for chills and fever.   HENT: Positive for hearing loss. Negative for trouble swallowing.    Eyes: Positive for visual disturbance.   Respiratory: Positive for shortness of breath.    Cardiovascular: Positive for chest pain.   Gastrointestinal: Positive for abdominal pain and nausea. Negative for constipation, diarrhea and vomiting.   Genitourinary: Negative for urinary incontinence.   Musculoskeletal: Positive for arthralgias, back pain, joint swelling and neck pain. Negative for myalgias.   Neurological: Positive for dizziness and headache. Negative for weakness and numbness.       Objective   Physical Exam   Constitutional: She is oriented to person, place, and time. She appears well-developed and well-nourished.   HENT:   Head: Normocephalic and atraumatic.   Eyes: EOM are normal. Pupils are equal, round, and reactive to light.   Neck: Normal range of motion.   Cardiovascular: Normal rate, regular rhythm, normal heart sounds and " intact distal pulses.   Pulmonary/Chest: Breath sounds normal.   Abdominal: Soft. Bowel sounds are normal. She exhibits no distension. There is no tenderness.   Neurological: She is alert and oriented to person, place, and time. She has normal strength and normal reflexes. She displays normal reflexes. No sensory deficit.   Psychiatric: She has a normal mood and affect. Her behavior is normal. Thought content normal.         Assessment/Plan   Padmini was seen today for back pain and neck pain.    Diagnoses and all orders for this visit:    Chronic bilateral low back pain, unspecified whether sciatica present    Chronic pain disorder    Neck pain, chronic    Chronic left shoulder pain    Sacroiliac joint pain    Cervical spondylosis without myelopathy    Lumbosacral spondylosis without myelopathy    Fibromyositis    Spinal stenosis of lumbar region, unspecified whether neurogenic claudication present    Cervical stenosis of spinal canal        INspect in order. Mod risk per SOAPP. UDS 8/13/19 in order.  Increased to Norco 10/325mg TID prn, then QID, stop. Began Percocet 10mg QID prn, was taking as high as Oxycodone 15mg TID in recent past.  Failed Diclofenac cream. Reordered RxAlt #1 cream.  Cont other meds as prescribed.  Referred to Rheum for prior dz of autoimmune disease.  Failed PT, surgery, injections.  Ordered  PT, artemio for other needs.  Had 2nd of 3 L knee Supartz injections, failed steroids in past. Is s/p R TKA.  May need to inject b/l CMC thumb joints.  Referral to Gayle Pate to artemio for revision of R TKA.  RTC in 3 months for f/u.

## 2019-11-15 ENCOUNTER — TELEPHONE (OUTPATIENT)
Dept: FAMILY MEDICINE CLINIC | Facility: CLINIC | Age: 69
End: 2019-11-15

## 2019-11-15 NOTE — TELEPHONE ENCOUNTER
Please call patient let her know that there is no medication that I can prescribe for hoarseness. I would recommend drinking plenty water and using a humidifier next to her bed at night when she sleeps to help moisturize her airways.    Signature    Alessandra Pina MD  Family Medicine  [facility]        This document has been electronically signed by Alessandra Pina MD on November 15, 2019 3:24 PM

## 2019-11-15 NOTE — TELEPHONE ENCOUNTER
Pt called, c/o hoarseness. NO fever or sore throat. She would like for you to send her in something for her hoarseness to [facility].

## 2019-11-20 NOTE — TELEPHONE ENCOUNTER
Refill of patient's Combivent was sent to patient's pharmacy.    Signature    Alessandra Pina MD  Family [facility]        This document has been electronically signed by Alessandra Pina MD on November 20, 2019 8:15 AM

## 2019-11-21 DIAGNOSIS — I10 ESSENTIAL HYPERTENSION: ICD-10-CM

## 2019-11-21 RX ORDER — AMLODIPINE BESYLATE 5 MG/1
5 TABLET ORAL
Qty: 30 TABLET | Refills: 5 | Status: SHIPPED | OUTPATIENT
Start: 2019-11-21 | End: 2019-12-12 | Stop reason: SDUPTHER

## 2019-11-21 NOTE — TELEPHONE ENCOUNTER
Refill of patient's amlodipine 5 mg p.o. daily was sent to patient's pharmacy.    Signature    Alessandra Pina MD  [facility]        This document has been electronically signed by Alessandra Pina MD on November 21, 2019 8:11 AM

## 2019-12-11 ENCOUNTER — TELEPHONE (OUTPATIENT)
Dept: FAMILY MEDICINE CLINIC | Facility: CLINIC | Age: 69
End: 2019-12-11

## 2019-12-11 RX ORDER — FLUTICASONE PROPIONATE 50 MCG
2 SPRAY, SUSPENSION (ML) NASAL DAILY
Status: CANCELLED | OUTPATIENT
Start: 2019-12-11

## 2019-12-12 ENCOUNTER — OFFICE VISIT (OUTPATIENT)
Dept: FAMILY MEDICINE CLINIC | Facility: CLINIC | Age: 69
End: 2019-12-12

## 2019-12-12 VITALS
WEIGHT: 175 LBS | OXYGEN SATURATION: 91 % | SYSTOLIC BLOOD PRESSURE: 131 MMHG | HEIGHT: 66 IN | HEART RATE: 75 BPM | TEMPERATURE: 98 F | DIASTOLIC BLOOD PRESSURE: 84 MMHG | BODY MASS INDEX: 28.12 KG/M2

## 2019-12-12 DIAGNOSIS — J44.1 ACUTE EXACERBATION OF CHRONIC OBSTRUCTIVE PULMONARY DISEASE (COPD) (HCC): Primary | ICD-10-CM

## 2019-12-12 DIAGNOSIS — I10 ESSENTIAL HYPERTENSION: ICD-10-CM

## 2019-12-12 PROCEDURE — 96372 THER/PROPH/DIAG INJ SC/IM: CPT | Performed by: FAMILY MEDICINE

## 2019-12-12 PROCEDURE — 99214 OFFICE O/P EST MOD 30 MIN: CPT | Performed by: FAMILY MEDICINE

## 2019-12-12 RX ORDER — METHYLPREDNISOLONE SODIUM SUCCINATE 40 MG/ML
40 INJECTION, POWDER, LYOPHILIZED, FOR SOLUTION INTRAMUSCULAR; INTRAVENOUS ONCE
Status: DISCONTINUED | OUTPATIENT
Start: 2019-12-12 | End: 2019-12-12

## 2019-12-12 RX ORDER — CEFDINIR 300 MG/1
300 CAPSULE ORAL 2 TIMES DAILY
Qty: 8 CAPSULE | Refills: 0 | Status: SHIPPED | OUTPATIENT
Start: 2019-12-13 | End: 2019-12-17

## 2019-12-12 RX ORDER — PREDNISONE 20 MG/1
40 TABLET ORAL DAILY
Qty: 8 TABLET | Refills: 0 | Status: SHIPPED | OUTPATIENT
Start: 2019-12-13 | End: 2019-12-17

## 2019-12-12 RX ORDER — CEFTRIAXONE 1 G/1
1 INJECTION, POWDER, FOR SOLUTION INTRAMUSCULAR; INTRAVENOUS ONCE
Status: COMPLETED | OUTPATIENT
Start: 2019-12-12 | End: 2019-12-12

## 2019-12-12 RX ORDER — PROMETHAZINE HYDROCHLORIDE 25 MG/1
25 TABLET ORAL EVERY 8 HOURS PRN
Qty: 30 TABLET | Refills: 0 | Status: SHIPPED | OUTPATIENT
Start: 2019-12-12 | End: 2021-06-09

## 2019-12-12 RX ORDER — AMLODIPINE BESYLATE 5 MG/1
5 TABLET ORAL
Qty: 30 TABLET | Refills: 5 | Status: SHIPPED | OUTPATIENT
Start: 2019-12-12 | End: 2019-12-20 | Stop reason: SDUPTHER

## 2019-12-12 RX ORDER — METHYLPREDNISOLONE ACETATE 40 MG/ML
40 INJECTION, SUSPENSION INTRA-ARTICULAR; INTRALESIONAL; INTRAMUSCULAR; SOFT TISSUE ONCE
Status: COMPLETED | OUTPATIENT
Start: 2019-12-12 | End: 2019-12-12

## 2019-12-12 RX ORDER — PROMETHAZINE HYDROCHLORIDE 25 MG/1
TABLET ORAL
COMMUNITY
Start: 2019-12-11 | End: 2019-12-12 | Stop reason: SDUPTHER

## 2019-12-12 RX ADMIN — METHYLPREDNISOLONE ACETATE 40 MG: 40 INJECTION, SUSPENSION INTRA-ARTICULAR; INTRALESIONAL; INTRAMUSCULAR; SOFT TISSUE at 10:43

## 2019-12-12 RX ADMIN — CEFTRIAXONE 1 G: 1 INJECTION, POWDER, FOR SOLUTION INTRAMUSCULAR; INTRAVENOUS at 10:39

## 2019-12-12 NOTE — PATIENT INSTRUCTIONS
"COPD Action Plan  A COPD action plan is a description of what to do when you have a flare (exacerbation) of chronic obstructive pulmonary disease (COPD). Your action plan is a color-coded plan that lists the symptoms that indicate whether or not your condition is under control and what actions to take.  · If you have symptoms in the green zone, it means you are doing well that day.  · If you have symptoms in the yellow zone, it means you are having a bad day or an exacerbation.  · If you have symptoms in the red zone, you need urgent medical care.  Follow the plan you and your health care provider developed. Review your plan with your health care provider at each visit.  Red zone  Symptoms in this zone mean that you should get medical help right away. They include:  · Feeling very short of breath, even when you are resting.  · Not being able to do any activities because of poor breathing.  · Not being able to sleep because of poor breathing.  · Fever or shaking chills.  · Feeling confused or very sleepy.  · Chest pain.  · Coughing up blood.  If you have any of these symptoms, call emergency services (911 in the [place] or go to the nearest emergency room.  Yellow zone  Symptoms in this zone mean that your condition may be getting worse. They include:  · Feeling more short of breath than usual.  · Having less energy for daily activities than usual.  · Phlegm or mucus that is thicker than usual.  · Needing to use your rescue inhaler or nebulizer more often than usual.  · More ankle swelling than usual.  · Coughing more than usual.  · Feeling like you have a chest cold.  · Trouble sleeping due to COPD symptoms.  · Decreased appetite.  · COPD medicines not helping as much as usual.  If you experience any \"yellow\" symptoms:  · Keep taking your daily medicines as directed.  · Use your quick-relief inhaler as told by your health care provider.  · If you were prescribed steroid medicine to take by mouth (oral medicine), start " taking it as told by your health care provider.  · If you were prescribed an antibiotic, start taking it as told by your health care provider. Do not stop taking the antibiotic even if you start to feel better.  · Use oxygen as told by your health care provider.  · Get more rest.  · Do your pursed-lip breathing exercises.  · Do not smoke. Avoid any irritants in the air.  If your signs and symptoms do not improve after taking these steps, call your health care provider right away.  Green zone  Symptoms in this zone mean that you are doing well. They include:  · Being able to do your usual activities and exercise.  · Having the usual amount of coughing, including the same amount of phlegm or mucus.  · Being able to sleep well.  · Having a good appetite.  Follow these instructions at home:  · Continue taking your daily medicines as told by your health care provider.  · Make sure you receive all the immunizations that your health care provider recommends, especially the pneumococcal and influenza vaccines.  · Wash your hands often with soap and water. Have family members wash their hands too. Regular hand washing can help prevent infections.  · Follow your usual exercise and diet plan.  · Avoid irritants in the air, such as smoke.  · Do not use any products that contain nicotine or tobacco, such as cigarettes and e-cigarettes. If you need help quitting, ask your health care provider.  Where to find more information:  You can find more information about COPD from:  · American Lung Association, My COPD Action Plan: www.lung.org/assets/documents/copd/copd-action-plan.pdf  · COPD Foundation: www.copdfoundation.org  · National Heart, Lung, & Blood [place]: https://www.nhlbi.nih.gov/health-topics/copd  This information is not intended to replace advice given to you by your health care provider. Make sure you discuss any questions you have with your health care provider.  Document Released: 05/02/2018 Document Revised:  01/24/2019 Document Reviewed: 05/02/2018  [employer] Interactive Patient Education © 2019 [employer]    Preventive Care 65 Years and Older, Female  Preventive care refers to lifestyle choices and visits with your health care provider that can promote health and wellness.  What does preventive care include?  · A yearly physical exam. This is also called an annual well check.  · Dental exams once or twice a year.  · Routine eye exams. Ask your health care provider how often you should have your eyes checked.  · Personal lifestyle choices, including:  ? Daily care of your teeth and gums.  ? Regular physical activity.  ? Eating a healthy diet.  ? Avoiding tobacco and drug use.  ? Limiting alcohol use.  ? Practicing safe sex.  ? Taking low-dose aspirin every day.  ? Taking vitamin and mineral supplements as recommended by your health care provider.  What happens during an annual well check?  The services and screenings done by your health care provider during your annual well check will depend on your age, overall health, lifestyle risk factors, and family history of disease.  Counseling  Your health care provider may ask you questions about your:  · Alcohol use.  · Tobacco use.  · Drug use.  · Emotional well-being.  · Home and relationship well-being.  · Sexual activity.  · Eating habits.  · History of falls.  · Memory and ability to understand (cognition).  · Work and work environment.  · Reproductive health.    Screening  You may have the following tests or measurements:  · Height, weight, and BMI.  · Blood pressure.  · Lipid and cholesterol levels. These may be checked every 5 years, or more frequently if you are over 50 years old.  · Skin check.  · Lung cancer screening. You may have this screening every year starting at age 55 if you have a 30-pack-year history of smoking and currently smoke or have quit within the past 15 years.  · Colorectal cancer screening. All adults should have this screening starting at age  50 and continuing until age 75. You will have tests every 1-10 years, depending on your results and the type of screening test. People at increased risk should start screening at an earlier age. Screening tests may include:  ? Guaiac-based fecal occult blood testing.  ? Fecal immunochemical test (FIT).  ? Stool DNA test.  ? Virtual colonoscopy.  ? Sigmoidoscopy. During this test, a flexible tube with a tiny camera (sigmoidoscope) is used to examine your rectum and lower colon. The sigmoidoscope is inserted through your anus into your rectum and lower colon.  ? Colonoscopy. During this test, a long, thin, flexible tube with a tiny camera (colonoscope) is used to examine your entire colon and rectum.  · Hepatitis C blood test.  · Hepatitis B blood test.  · Sexually transmitted disease (STD) testing.  · Diabetes screening. This is done by checking your blood sugar (glucose) after you have not eaten for a while (fasting). You may have this done every 1-3 years.  · Bone density scan. This is done to screen for osteoporosis. You may have this done starting at age 65.  · Mammogram. This may be done every 1-2 years. Talk to your health care provider about how often you should have regular mammograms.  Talk with your health care provider about your test results, treatment options, and if necessary, the need for more tests.  Vaccines  Your health care provider may recommend certain vaccines, such as:  · Influenza vaccine. This is recommended every year.  · Tetanus, diphtheria, and acellular pertussis (Tdap, Td) vaccine. You may need a Td booster every 10 years.  · Varicella vaccine. You may need this if you have not been vaccinated.  · Zoster vaccine. You may need this after age 60.  · Measles, mumps, and rubella (MMR) vaccine. You may need at least one dose of MMR if you were born in 1957 or later. You may also need a second dose.  · Pneumococcal 13-valent conjugate (PCV13) vaccine. One dose is recommended after age  65.  · Pneumococcal polysaccharide (PPSV23) vaccine. One dose is recommended after age 65.  · Meningococcal vaccine. You may need this if you have certain conditions.  · Hepatitis A vaccine. You may need this if you have certain conditions or if you travel or work in places where you may be exposed to hepatitis A.  · Hepatitis B vaccine. You may need this if you have certain conditions or if you travel or work in places where you may be exposed to hepatitis B.  · Haemophilus influenzae type b (Hib) vaccine. You may need this if you have certain conditions.  Talk to your health care provider about which screenings and vaccines you need and how often you need them.  This information is not intended to replace advice given to you by your health care provider. Make sure you discuss any questions you have with your health care provider.  Document Released: 01/13/2017 Document Revised: 02/07/2019 Document Reviewed: 10/18/2016  [employer] Interactive Patient Education © 2019 [employer]

## 2019-12-12 NOTE — PROGRESS NOTES
Subjective:     Padmini Holt is a 69 y.o. female who presents for  Chief Complaint   Patient presents with   • URI     cough,congestion, chills,fever - feels better today than she has been   • Medication Problem     leaving 12/18/19 for [place]- will be there until end of December- isn't sure what to do about her meds/refills       This is a known patient to me.      Patient with a concurrent medical history of COPD presenting with complaints of URI. Has been prescribed maintenance inhalers in the past but reports little to no efficacy of inhalers. Currently using JORGE LUIS-NENA (Combivent) daily.     URI    This is a new problem. The current episode started in the past 7 days. The problem has been unchanged. There has been no fever. Associated symptoms include congestion, coughing, sinus pain and wheezing. Pertinent negatives include no abdominal pain, chest pain, diarrhea, dysuria, nausea, rash, rhinorrhea, sneezing, sore throat or vomiting. She has tried nothing for the symptoms.     Patient will be traveling to [place] during the winter holiday and requesting refills of medications before her trip.     Preventative:  Health Maintenance   Topic Date Due   • MAMMOGRAM  05/17/2014   • ZOSTER VACCINE (2 of 3) 11/26/2016   • MEDICARE ANNUAL WELLNESS  05/09/2019   • COLONOSCOPY  05/09/2019   • INFLUENZA VACCINE  08/01/2019   • DXA SCAN  06/21/2020   • TDAP/TD VACCINES (2 - Td) 10/09/2024   • HEPATITIS C SCREENING  Completed   • PNEUMOCOCCAL VACCINES (65+ LOW/MEDIUM RISK)  Completed       Past Medical Hx:  Past Medical History:   Diagnosis Date   • Anxiety    • Anxiety state     Abstraction from [facility] Onset Date uns. and depression. I think a component of her mood and personality is affected by her brain surgery for meningioma and residual bifrontal encephalomalacia. 03-   • Anxiety with depression 04/24/2018    Deteriorated 10-   • Asthma with COPD (CMS/[facility]) 09/04/2018   • Chronic diarrhea  03/07/2014    With irritable bowel syndrome, pancreatic divisum likely causing chronic pancreatitis. -2014   • Chronic low back pain 08/23/2018   • Chronic neck pain 08/23/2018   • Chronic pain of right knee 01/17/2014    Abstraction from [facility] and osteoarthritis. continue meloxicam. 03-   • Chronic pain syndrome     [facility] On[facility] uns. With degenerative disc disease, spinal stenosis, chronic back pain. 03-   • COPD, severe (CMS/HCC)     [facility]raction from [facility] COPD Severe--FEV1 48% pred (05-) Onset Date uns   • DDD (degenerative disc disease), cervical     Abstraction from [facility] Onset Date uns   • DDD (degenerative disc disease), lumbar     Abstraction from [facility] Onset Date uns   • Degenerative joint disease of left knee 02/22/2018   • Depression, major, recurrent, moderate (CMS/HCC) 02/06/2018    Deteriorated 04-   • Depressive disorder 09/29/2011    And anxiety. Likely bipolar disorder. 05- [facility]   • Exertional shortness of breath 12/19/2018   • Fatigue 12/19/2018   • Fibromyositis 05/22/2018    Deteriorated 05-   • GERD (gastroesophageal reflux disease) 05/10/2018    Abstraction from [facility] Chronic nausea and pancreatic divisum. 11- multiple visits   • H/O diverticulitis of colon     Abstraction from [facility] Onset Date uns   • History of benign meningioma of brain 01/14/2019    Abstraction from [facility] Meningioma, brain   • History of epilepsy    • History of meningioma 08/01/2010    Do not suspect any serious problem causing her abnormal and atypical scalp symptoms 09-   • History of MRSA infection     Abstraction from [facility] Onset Date uns   • History of seizure disorder     Abstraction from [facility] Onset Date uns   • History of tobacco use     Abstraction from [facility] Onset Date uns   • Hypertension 09/29/2011    NOS    • Insomnia, unspecified 09/29/2011    Stable with 3 mg of traxodone  each evening. 05-   • Left shoulder pain 03/14/2018   • Need for vaccination for Strep pneumoniae 12/19/2018    With Prevnar 13   • Obesity 12/19/2018    Abstraction from [facility] multiple visits 09-    • Osteoarthritis     [facility] from [facility] Onset Date uns   • Osteoporosis 01/01/1960    and history of vitamin D deficienty. 11-   • Pain in joint involving multiple sites 08/23/2018   • Pancreas divisum     [facility]raction from [facility] Onset Date uns   • Personal history of cardiovascular disorder     Abstraction from [facility] Atrial fibrillation paroxysmal, h/o Patient reports that she has apparently had some atrial fibrillation following a prior surgery. 08-   • Pineal gland, tumor    • Preventative health care 09/29/2011    Mammogram May 2012 normal. 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 01-       Past Surgical Hx:  Past Surgical History:   Procedure Laterality Date   • APPENDECTOMY     • ARTHROSCOPY SHOULDER W/ OPEN ROTATOR CUFF REPAIR Left 04/13/2018    Abstraction from [facility] Left Shoulder Scope/cuff repair   • BACK SURGERY     • BRAIN SURGERY  08/2010    Brain surgery - meningiomas   • CHOLECYSTECTOMY     • DILATATION AND CURETTAGE     • HYSTERECTOMY      Total hysterectomy - noncancer   • KNEE SURGERY Right     2-(r) knee surgeries   • OTHER SURGICAL HISTORY      ulnar nerve releast   • OTHER SURGICAL HISTORY      Pancreatic Stent    • OTHER SURGICAL HISTORY      Breast Biopsies   • OTHER SURGICAL HISTORY      Tubal ligation   • REFRACTIVE SURGERY     • TOTAL KNEE ARTHROPLASTY Right 09/2013    Abstraction from [facility]        Family Hx:  Family History   Problem Relation Age of Onset   • COPD Father    • Other Other         Substance Abuse        Social History:  Social History     Socioeconomic History   • Marital status:      Spouse name: Not on file   • Number of children: Not on file   • Years of education: Not on file   • Highest education level: Not on file   Tobacco Use   • Smoking status: Former Smoker     Types: Cigarettes     Last attempt to quit: 10/8/2019     Years since quittin.1   • Smokeless tobacco: Never Used   Substance and Sexual Activity   • Alcohol use: No     Frequency: Never   • Drug use: No   • Sexual activity: Never       Allergies:  Pregabalin; Gabapentin; Citalopram; Codeine; Duloxetine hcl; Teriparatide (recombinant); and Vortioxetine    Current Meds:    Current Outpatient Medications:   •  albuterol (PROVENTIL) (2.5 MG/3ML) 0.083% nebulizer solution, Take  by nebulization 3 (Three) Times a Day. Use 1 Vial three times daily for a total of three vials daily, Disp: , Rfl:   •  ALPRAZolam (XANAX) 1 MG tablet, Take 1 mg by mouth At Night As Needed for Anxiety., Disp: , Rfl:   •  amLODIPine (NORVASC) 5 MG tablet, Take 1 tablet by mouth Daily With Breakfast., Disp: 30 tablet, Rfl: 5  •  atenolol (TENORMIN) 50 MG tablet, Take 50 mg by mouth 2 (Two) Times a Day., Disp: , Rfl:   •  budesonide-formoterol (SYMBICORT) 160-4.5 MCG/ACT inhaler, Inhale 2 puffs 2 (Two) Times a Day., Disp: 6 g, Rfl: 0  •  furosemide (LASIX) 40 MG tablet, Take 40 mg by mouth 2 (Two) Times a Day As Needed., Disp: , Rfl:   •  ipratropium-albuterol (COMBIVENT RESPIMAT)  MCG/ACT inhaler, Inhale 1 puff 4 (Four) Times a Day As Needed for Wheezing or Shortness of Air., Disp: 4 g, Rfl: 5  •  oxyCODONE-acetaminophen (PERCOCET)  MG per tablet, Take 1 tablet by mouth Every 6 (Six) Hours As Needed for Moderate Pain . for pain, Disp: 120 tablet, Rfl: 0  •  potassium chloride (MICRO-K) 10 MEQ CR capsule, Take 10 mEq by mouth 2  "(Two) Times a Day As Needed. Take one tab po w each dose of Lasix , Disp: , Rfl:   •  promethazine (PHENERGAN) 25 MG tablet, , Disp: , Rfl:   •  QUEtiapine (SEROquel) 200 MG tablet, Take 200 mg by mouth every night at bedtime., Disp: , Rfl: 0  •  traZODone (DESYREL) 100 MG tablet, Take 1 tablet by mouth Every Night. 1 by mouth every night, Disp: 90 tablet, Rfl: 1  •  Umeclidinium Bromide 62.5 MCG/INH aerosol powder , Inhale 1 puff Daily., Disp: 30 each, Rfl: 1    The following portions of the patient's history were reviewed and updated as appropriate: allergies, current medications, past family history, past medical history, past social history, past surgical history and problem list.    Review of Systems  Review of Systems   Constitutional: Positive for chills. Negative for diaphoresis, fatigue and fever.   HENT: Positive for congestion and sinus pressure. Negative for rhinorrhea, sneezing and sore throat.    Eyes: Negative for blurred vision, double vision and redness.   Respiratory: Positive for cough and wheezing. Negative for shortness of breath.    Cardiovascular: Negative for chest pain and leg swelling.   Gastrointestinal: Negative for abdominal pain, constipation, diarrhea, nausea and vomiting.   Endocrine: Negative for polydipsia, polyphagia and polyuria.   Genitourinary: Negative for difficulty urinating, dysuria and hematuria.   Musculoskeletal: Negative for arthralgias, gait problem and myalgias.   Skin: Negative for rash and skin lesions.   Neurological: Negative for tremors, seizures, syncope and headache.   Psychiatric/Behavioral: Positive for dysphoric mood and sleep disturbance. The patient is not nervous/anxious.      Objective:     /84 (BP Location: Left arm, Patient Position: Sitting, Cuff Size: Large Adult)   Pulse 75   Temp 98 °F (36.7 °C) (Oral)   Ht 167.6 cm (66\")   Wt 79.4 kg (175 lb)   LMP  (LMP Unknown)   SpO2 91%   BMI 28.25 kg/m²     Physical Exam   Constitutional: She is " oriented to person, place, and time. She appears well-developed and well-nourished. No distress. She appears overweight.   HENT:   Head: Normocephalic and atraumatic.   Right Ear: Tympanic membrane and ear canal normal.   Left Ear: Tympanic membrane and ear canal normal.   Nose: Nose normal.   Mouth/Throat: Oropharynx is clear and moist.   Eyes: Pupils are equal, round, and reactive to light. Conjunctivae and EOM are normal. No scleral icterus.   Neck: Neck supple. No tracheal deviation present. No thyromegaly present.   Cardiovascular: Normal rate, regular rhythm, normal heart sounds and intact distal pulses.   Pulmonary/Chest: Effort normal. She has wheezes. She has rhonchi.   Abdominal: Soft. Bowel sounds are normal. There is no tenderness.   Lymphadenopathy:     She has no cervical adenopathy.   Neurological: She is alert and oriented to person, place, and time.   Skin: Skin is warm and dry. Capillary refill takes less than 2 seconds. No rash noted. She is not diaphoretic.   Psychiatric: She has a normal mood and affect. Her behavior is normal.   Vitals reviewed.      Lab Results   Component Value Date    WBC 10.00 10/30/2019    HGB 13.9 10/30/2019    HCT 40.0 10/30/2019    MCV 97.9 (H) 10/30/2019     10/30/2019     Lab Results   Component Value Date    GLUCOSE 119 (H) 10/30/2019    BUN 9 10/30/2019    CREATININE 0.49 (L) 10/30/2019    EGFRIFNONA 125 10/30/2019    BCR 18.4 10/30/2019    K 3.9 10/30/2019    CO2 27.0 10/30/2019    CALCIUM 9.0 10/30/2019    ALBUMIN 4.70 10/29/2019    LABIL2 1.9 (H) 12/19/2018    AST 15 10/29/2019    ALT 20 10/29/2019     Lab Results   Component Value Date    HGBA1C 5.5 10/31/2019     Lab Results   Component Value Date    CHOL 182 12/19/2018    TRIG 137 12/19/2018    HDL 62 12/19/2018     (H) 12/19/2018     The 10-year ASCVD risk score (Krzysztofgreer HART Jr., et al., 2013) is: 11.2%    Values used to calculate the score:      Age: 69 years      Sex: Female      Is Non-  : No      Diabetic: No      Tobacco smoker: No      Systolic Blood Pressure: 131 mmHg      Is BP treated: Yes      HDL Cholesterol: 62 mg/dL      Total Cholesterol: 182 mg/dL        Assessment/Plan:     Padmini was seen today for uri and medication problem.    Diagnoses and all orders for this visit:    Acute exacerbation of chronic obstructive pulmonary disease (COPD) (CMS/[facility])  -     cefTRIAXone (ROCEPHIN) injection 1 g  -     cefdinir (OMNICEF) 300 MG capsule; Take 1 capsule by mouth 2 (Two) Times a Day for 4 days.  -     predniSONE (DELTASONE) 20 MG tablet; Take 2 tablets by mouth Daily for 4 days.  -     tiotropium bromide monohydrate (SPIRIVA RESPIMAT) 2.5 MCG/ACT aerosol solution inhaler; Inhale 2 puffs Daily.  -     methylPREDNISolone acetate (DEPO-medrol) injection 40 mg  - Chronic problem that is poorly controlled. Patient is agreeable to starting maintenance therapy, Spiriva.   - Patient given a dose of empiric antibiotics and IM steroids in office. Advised to started PO antibiotics and steroids tomorrow. Declined breathing treatment. Advised to call the office with nebulizer medication. Recommend patient use Duoneb or Combivent TID for the next week.     Essential hypertension  -     amLODIPine (NORVASC) 5 MG tablet; Take 1 tablet by mouth Daily With Breakfast.  - Chronic problem that is well controlled. Normotensive in office. Advised to continue antihypertensive medication.     Other orders  -     promethazine (PHENERGAN) 25 MG tablet; Take 1 tablet by mouth Every 8 (Eight) Hours As Needed for Nausea or Vomiting.      Follow-up:     Return in about 3 months (around 3/12/2020) for Recheck COPD.      Signature    Alessandra Pina MD  Family Medicine  [facility]        This document has been electronically signed by Alessandra Pina MD on December 12, 2019 10:05 AM

## 2019-12-12 NOTE — TELEPHONE ENCOUNTER
Patient seen in office today and given a course of oral antibiotics and oral steroids for acute COPD exacerbation.    Signature    Alessandra Pina MD  [facility]        This document has been electronically signed by Alessandra Pina MD on December 12, 2019 12:44 PM

## 2019-12-13 ENCOUNTER — TELEPHONE (OUTPATIENT)
Dept: FAMILY MEDICINE CLINIC | Facility: CLINIC | Age: 69
End: 2019-12-13

## 2019-12-13 DIAGNOSIS — J44.1 CHRONIC OBSTRUCTIVE PULMONARY DISEASE WITH ACUTE EXACERBATION (HCC): Primary | ICD-10-CM

## 2019-12-14 NOTE — TELEPHONE ENCOUNTER
Please call patient and let her know that her insurance company prefers Incruse to Spiriva.     I would like patient to use Incruse every single day to help control her COPD symptoms. For the next week, while she's taking the antibiotics and steroids, I'd like her to use her Combivent inhaler TID. After that, I would recommend using it as needed.     Signature    Alessandra Pina MD  Family [facility]        This document has been electronically signed by Alessandra Pina MD on December 14, 2019 6:21 PM

## 2019-12-20 DIAGNOSIS — I10 ESSENTIAL HYPERTENSION: ICD-10-CM

## 2019-12-21 RX ORDER — AMLODIPINE BESYLATE 5 MG/1
5 TABLET ORAL
Qty: 90 TABLET | Refills: 1 | Status: SHIPPED | OUTPATIENT
Start: 2019-12-21 | End: 2020-10-27 | Stop reason: SDUPTHER

## 2019-12-21 NOTE — TELEPHONE ENCOUNTER
Refill of amlodipine were sent to patient's pharmacy.    Signature    Alessandra Pina MD  Family Medicine  [facility]        This document has been electronically signed by Alessandra Pina MD on December 21, 2019 5:44 PM

## 2020-01-16 ENCOUNTER — HOSPITAL ENCOUNTER (EMERGENCY)
Facility: HOSPITAL | Age: 70
Discharge: HOME | End: 2020-01-16
Admitting: EMERGENCY MEDICINE

## 2020-01-16 ENCOUNTER — APPOINTMENT (OUTPATIENT)
Dept: GENERAL RADIOLOGY | Facility: HOSPITAL | Age: 70
End: 2020-01-16

## 2020-01-16 ENCOUNTER — TELEPHONE (OUTPATIENT)
Dept: FAMILY MEDICINE CLINIC | Facility: CLINIC | Age: 70
End: 2020-01-16

## 2020-01-16 VITALS
SYSTOLIC BLOOD PRESSURE: 135 MMHG | OXYGEN SATURATION: 96 % | HEIGHT: 66 IN | BODY MASS INDEX: 28.13 KG/M2 | TEMPERATURE: 98 F | RESPIRATION RATE: 18 BRPM | DIASTOLIC BLOOD PRESSURE: 61 MMHG | HEART RATE: 74 BPM | WEIGHT: 175.04 LBS

## 2020-01-16 DIAGNOSIS — R06.00 DYSPNEA, UNSPECIFIED TYPE: ICD-10-CM

## 2020-01-16 DIAGNOSIS — J44.1 CHRONIC OBSTRUCTIVE PULMONARY DISEASE WITH ACUTE EXACERBATION (HCC): Primary | ICD-10-CM

## 2020-01-16 DIAGNOSIS — J44.1 COPD EXACERBATION (HCC): Primary | ICD-10-CM

## 2020-01-16 LAB
ALBUMIN SERPL-MCNC: 4.4 G/DL (ref 3.5–5.2)
ALBUMIN/GLOB SERPL: 2 G/DL
ALP SERPL-CCNC: 67 U/L (ref 39–117)
ALT SERPL W P-5'-P-CCNC: 14 U/L (ref 1–33)
ANION GAP SERPL CALCULATED.3IONS-SCNC: 13 MMOL/L (ref 5–15)
AST SERPL-CCNC: 16 U/L (ref 1–32)
BASOPHILS # BLD AUTO: 0.1 10*3/MM3 (ref 0–0.2)
BASOPHILS NFR BLD AUTO: 0.7 % (ref 0–1.5)
BILIRUB SERPL-MCNC: 0.2 MG/DL (ref 0.2–1.2)
BUN BLD-MCNC: 8 MG/DL (ref 8–23)
BUN/CREAT SERPL: 14.3 (ref 7–25)
CALCIUM SPEC-SCNC: 9.2 MG/DL (ref 8.6–10.5)
CHLORIDE SERPL-SCNC: 101 MMOL/L (ref 98–107)
CO2 SERPL-SCNC: 26 MMOL/L (ref 22–29)
CREAT BLD-MCNC: 0.56 MG/DL (ref 0.57–1)
DEPRECATED RDW RBC AUTO: 42.9 FL (ref 37–54)
EOSINOPHIL # BLD AUTO: 0.1 10*3/MM3 (ref 0–0.4)
EOSINOPHIL NFR BLD AUTO: 1.8 % (ref 0.3–6.2)
ERYTHROCYTE [DISTWIDTH] IN BLOOD BY AUTOMATED COUNT: 12.5 % (ref 12.3–15.4)
GFR SERPL CREATININE-BSD FRML MDRD: 107 ML/MIN/1.73
GLOBULIN UR ELPH-MCNC: 2.2 GM/DL
GLUCOSE BLD-MCNC: 98 MG/DL (ref 65–99)
HCT VFR BLD AUTO: 37.6 % (ref 34–46.6)
HGB BLD-MCNC: 13.7 G/DL (ref 12–15.9)
INR PPP: 1.12 (ref 0.9–1.1)
LYMPHOCYTES # BLD AUTO: 2.6 10*3/MM3 (ref 0.7–3.1)
LYMPHOCYTES NFR BLD AUTO: 34.8 % (ref 19.6–45.3)
MCH RBC QN AUTO: 35.4 PG (ref 26.6–33)
MCHC RBC AUTO-ENTMCNC: 36.4 G/DL (ref 31.5–35.7)
MCV RBC AUTO: 97.3 FL (ref 79–97)
MONOCYTES # BLD AUTO: 0.8 10*3/MM3 (ref 0.1–0.9)
MONOCYTES NFR BLD AUTO: 10.6 % (ref 5–12)
NEUTROPHILS # BLD AUTO: 3.8 10*3/MM3 (ref 1.7–7)
NEUTROPHILS NFR BLD AUTO: 52.1 % (ref 42.7–76)
NRBC BLD AUTO-RTO: 0 /100 WBC (ref 0–0.2)
NT-PROBNP SERPL-MCNC: 107.9 PG/ML (ref 5–900)
PLATELET # BLD AUTO: 301 10*3/MM3 (ref 140–450)
PMV BLD AUTO: 7.8 FL (ref 6–12)
POTASSIUM BLD-SCNC: 4.1 MMOL/L (ref 3.5–5.2)
PROT SERPL-MCNC: 6.6 G/DL (ref 6–8.5)
PROTHROMBIN TIME: 11.5 SECONDS (ref 9.6–11.7)
RBC # BLD AUTO: 3.87 10*6/MM3 (ref 3.77–5.28)
SODIUM BLD-SCNC: 140 MMOL/L (ref 136–145)
TROPONIN T SERPL-MCNC: <0.01 NG/ML (ref 0–0.03)
WBC NRBC COR # BLD: 7.4 10*3/MM3 (ref 3.4–10.8)

## 2020-01-16 PROCEDURE — 99284 EMERGENCY DEPT VISIT MOD MDM: CPT

## 2020-01-16 PROCEDURE — 25010000002 METHYLPREDNISOLONE PER 125 MG: Performed by: NURSE PRACTITIONER

## 2020-01-16 PROCEDURE — 93005 ELECTROCARDIOGRAM TRACING: CPT

## 2020-01-16 PROCEDURE — 94640 AIRWAY INHALATION TREATMENT: CPT

## 2020-01-16 PROCEDURE — 96374 THER/PROPH/DIAG INJ IV PUSH: CPT

## 2020-01-16 PROCEDURE — 84484 ASSAY OF TROPONIN QUANT: CPT | Performed by: NURSE PRACTITIONER

## 2020-01-16 PROCEDURE — 71045 X-RAY EXAM CHEST 1 VIEW: CPT

## 2020-01-16 PROCEDURE — 85025 COMPLETE CBC W/AUTO DIFF WBC: CPT | Performed by: NURSE PRACTITIONER

## 2020-01-16 PROCEDURE — 85610 PROTHROMBIN TIME: CPT | Performed by: NURSE PRACTITIONER

## 2020-01-16 PROCEDURE — 83880 ASSAY OF NATRIURETIC PEPTIDE: CPT | Performed by: NURSE PRACTITIONER

## 2020-01-16 PROCEDURE — 80053 COMPREHEN METABOLIC PANEL: CPT | Performed by: NURSE PRACTITIONER

## 2020-01-16 RX ORDER — PREDNISONE 20 MG/1
20 TABLET ORAL DAILY
Qty: 5 TABLET | Refills: 0 | Status: SHIPPED | OUTPATIENT
Start: 2020-01-16 | End: 2020-10-12

## 2020-01-16 RX ORDER — ALBUTEROL SULFATE 90 UG/1
2 AEROSOL, METERED RESPIRATORY (INHALATION) EVERY 4 HOURS PRN
Qty: 18 G | Refills: 0 | Status: SHIPPED | OUTPATIENT
Start: 2020-01-16 | End: 2020-01-31

## 2020-01-16 RX ORDER — SODIUM CHLORIDE 0.9 % (FLUSH) 0.9 %
10 SYRINGE (ML) INJECTION AS NEEDED
Status: DISCONTINUED | OUTPATIENT
Start: 2020-01-16 | End: 2020-01-17 | Stop reason: HOSPADM

## 2020-01-16 RX ORDER — METHYLPREDNISOLONE SODIUM SUCCINATE 125 MG/2ML
125 INJECTION, POWDER, LYOPHILIZED, FOR SOLUTION INTRAMUSCULAR; INTRAVENOUS ONCE
Status: COMPLETED | OUTPATIENT
Start: 2020-01-16 | End: 2020-01-16

## 2020-01-16 RX ORDER — IPRATROPIUM BROMIDE AND ALBUTEROL SULFATE 2.5; .5 MG/3ML; MG/3ML
3 SOLUTION RESPIRATORY (INHALATION) ONCE
Status: COMPLETED | OUTPATIENT
Start: 2020-01-16 | End: 2020-01-16

## 2020-01-16 RX ADMIN — METHYLPREDNISOLONE SODIUM SUCCINATE 125 MG: 125 INJECTION, POWDER, FOR SOLUTION INTRAMUSCULAR; INTRAVENOUS at 21:55

## 2020-01-16 RX ADMIN — IPRATROPIUM BROMIDE AND ALBUTEROL SULFATE 3 ML: .5; 3 SOLUTION RESPIRATORY (INHALATION) at 21:46

## 2020-01-16 NOTE — TELEPHONE ENCOUNTER
Pt cannot take Incruse. Says it doesn't help, its inconvenient, and is $300. She can only use Combivent Respimat.

## 2020-01-17 NOTE — DISCHARGE INSTRUCTIONS
Use prednisone as directed    Use albuterol inhaler in place of Combivent until you can get the Combivent refilled    Low up with primary care for recheck in 2 to 3 days and return to the emergency room if worse

## 2020-01-17 NOTE — ED NOTES
Pt sleeping. Daughter confirmed that she had taken her bedtime meds, including a sleeping agent, prior to calling ems. Pt exhibits no soa.     Kayley Cage RN  01/16/20 [id number]

## 2020-01-17 NOTE — ED PROVIDER NOTES
Subjective   Patient is a 69-year-old female that is here with family who complains of acute onset of shortness of breath that started about 5:00 this evening.  She has a history of COPD.  He states that she is out of her Combivent inhaler which she uses at home she denies any chest pain she denies any fever or chills she states that she has COPD and that she normally coughs but she has been coughing up some clear sputum lately.  She also reports a decrease in oral intake          Review of Systems   Constitutional: Negative for chills, fatigue and fever.   HENT: Negative for congestion, tinnitus and trouble swallowing.    Eyes: Negative for photophobia, discharge and redness.   Respiratory: Positive for cough, shortness of breath and wheezing.    Cardiovascular: Negative for chest pain and palpitations.   Gastrointestinal: Negative for abdominal pain, diarrhea, nausea and vomiting.   Genitourinary: Negative for dysuria, frequency and urgency.   Musculoskeletal: Negative for back pain, joint swelling and myalgias.   Skin: Negative for rash.   Neurological: Positive for weakness. Negative for dizziness and headaches.   Psychiatric/Behavioral: Negative for confusion.   All other systems reviewed and are negative.      Past Medical History:   Diagnosis Date   • Anxiety    • Anxiety state     Abstraction from [facility] Onset Date uns. and depression. I think a component of her mood and personality is affected by her brain surgery for meningioma and residual bifrontal encephalomalacia. 03-   • Anxiety with depression 04/24/2018    Deteriorated 10-   • Asthma with COPD (CMS/[facility]) 09/04/2018   • Chronic diarrhea 03/07/2014    With irritable bowel syndrome, pancreatic divisum likely causing chronic pancreatitis. -2014   • Chronic low back pain 08/23/2018   • Chronic neck pain 08/23/2018   • Chronic pain of right knee 01/17/2014    Abstraction from [facility] and osteoarthritis. continue meloxicam.  03-   • Chronic pain syndrome     [facility] Onset Date uns. With degenerative disc disease, spinal stenosis, chronic back pain. 03-   • COPD, severe (CMS/HCC)     Abstraction from [facility] COPD Severe--FEV1 48% pred (05-) Onset Date uns   • DDD (degenerative disc disease), cervical     [facility] Onset Date uns   • DDD (degenerative disc disease), lumbar     [facility] from [facility] Onset Date uns   • Degenerative joint disease of left knee 02/22/2018   • Depression, major, recurrent, moderate (CMS/HCC) 02/06/2018    Deteriorated 04-   • Depressive disorder 09/29/2011    And anxiety. Likely bipolar disorder. 05- [facility]   • Exertional shortness of breath 12/19/2018   • Fatigue 12/19/2018   • Fibromyositis 05/22/2018    Deteriorated 05-   • GERD (gastroesophageal reflux disease) 05/10/2018    Abstraction from [facility] Chronic nausea and pancreatic divisum. 11- multiple visits   • H/O diverticulitis of colon     Abstraction from [facility] Onset Date uns   • History of benign meningioma of brain 01/14/2019    Abstraction from [facility] Meningioma, brain   • History of epilepsy    • History of meningioma 08/01/2010    Do not suspect any serious problem causing her abnormal and atypical scalp symptoms 09-   • History of MRSA infection     Abstraction from [facility] Onset Date uns   • History of seizure disorder     Abstraction from [facility] Onset Date uns   • History of tobacco use     Abstraction from [facility] Onset Date uns   • Hypertension 09/29/2011    NOS    • Insomnia, unspecified 09/29/2011    Stable with 3 mg of traxodone each evening. 05-   • Left shoulder pain 03/14/2018   • Need for vaccination for Strep pneumoniae 12/19/2018    With Prevnar 13   • Obesity 12/19/2018    [facility] multiple visits 09-    • Osteoarthritis     [facility]raction from [facility] Onset Date uns   •  Osteoporosis 01/01/1960    and history of vitamin D deficienty. 11-   • Pain in joint involving multiple sites 08/23/2018   • Pancreas divisum     Abstraction from [facility] Onset Date uns   • Personal history of cardiovascular disorder     Abstraction from [facility] Atrial fibrillation paroxysmal, h/o Patient reports that she has apparently had some atrial fibrillation following a prior surgery. 08-   • Pineal gland, tumor    • Preventative health care 09/29/2011    Mammogram May 2012 normal. 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 01-       Allergies   Allergen Reactions   • Pregabalin Other (See Comments)     [facility]straction from [facility] Reaction Throat Swelling  Other reaction(s): throat swellied   • Gabapentin Other (See Comments)     Abstraction from [facility] Reaction Burns her up  Other reaction(s): burns her up  FEELS ILL NAUSEA , ACHES, FEVER,PAIN     • Citalopram Other (See Comments)     nausea   • Codeine Other (See Comments)     FLU LIKE SYMPTOMS, ACHING, NAUSEA   • Duloxetine Hcl Other (See Comments)     nausea   • Teriparatide (Recombinant) Nausea Only   • Vortioxetine Other (See Comments)     FLU LIKE SYMPTOMS, ACHING, FEVER , SORE MOUTH,NAUSEA       Past Surgical History:   Procedure Laterality Date   • APPENDECTOMY     • ARTHROSCOPY SHOULDER W/ OPEN ROTATOR CUFF REPAIR Left 04/13/2018    Abstraction from [facility] Left Shoulder Scope/cuff repair   • BACK SURGERY     • BRAIN SURGERY  08/2010    Brain surgery - meningiomas   • CHOLECYSTECTOMY     • DILATATION AND CURETTAGE     • HYSTERECTOMY      Total hysterectomy - noncancer   • KNEE  SURGERY Right     2-(r) knee surgeries   • OTHER SURGICAL HISTORY      ulnar nerve releast   • OTHER SURGICAL HISTORY      Pancreatic Stent    • OTHER SURGICAL HISTORY      Breast Biopsies   • OTHER SURGICAL HISTORY      Tubal ligation   • REFRACTIVE SURGERY     • TOTAL KNEE ARTHROPLASTY Right 2013    Abstraction from [facility]       Family History   Problem Relation Age of Onset   • COPD Father    • Other Other         Substance Abuse       Social History     Socioeconomic History   • Marital status:      Spouse name: Not on file   • Number of children: Not on file   • Years of education: Not on file   • Highest education level: Not on file   Tobacco Use   • Smoking status: Former Smoker     Types: Cigarettes     Last attempt to quit: 10/8/2019     Years since quittin.2   • Smokeless tobacco: Never Used   Substance and Sexual Activity   • Alcohol use: No     Frequency: Never   • Drug use: No   • Sexual activity: Never           Objective   Physical Exam   Constitutional: She is oriented to person, place, and time. She appears well-developed and well-nourished.   HENT:   Head: Normocephalic and atraumatic.   Right Ear: Hearing, tympanic membrane, external ear and ear canal normal.   Left Ear: Hearing, tympanic membrane, external ear and ear canal normal.   Mouth/Throat: Uvula is midline and oropharynx is clear and moist. Mucous membranes are dry.   Eyes: Pupils are equal, round, and reactive to light. Conjunctivae, EOM and lids are normal.   Neck: Trachea normal, normal range of motion, full passive range of motion without pain and phonation normal. Neck supple.   Cardiovascular: Normal rate, regular rhythm, normal heart sounds and intact distal pulses.   Pulmonary/Chest: Effort normal. No respiratory distress. She has decreased breath sounds in the right middle field, the right lower field and the left lower field. She has no wheezes. She has rhonchi in the right lower field and the left lower field.  "  Abdominal: Soft. Bowel sounds are normal. She exhibits no distension and no mass. There is no tenderness. There is no rebound and no guarding.   Musculoskeletal: Normal range of motion. She exhibits no deformity.   Neurological: She is alert and oriented to person, place, and time. She displays normal reflexes. No cranial nerve deficit or sensory deficit. GCS eye subscore is 4. GCS verbal subscore is 5. GCS motor subscore is 6.   Skin: Skin is warm, dry and intact. Capillary refill takes 2 to 3 seconds. No rash noted.   Psychiatric: Her speech is normal and behavior is normal. Judgment and thought content normal. Her mood appears anxious. Cognition and memory are normal.       ECG 12 Lead    Date/Time: 1/16/2020 9:39 PM  Performed by: Poppy Herr APRN  Authorized by: Poppy Herr APRN   Interpreted by physician (Cathy)  Previous ECG: no previous ECG available  Rhythm: sinus rhythm  Rate: normal  BPM: 76  QRS axis: normal  Conduction: conduction normal  ST Segments: ST segments normal  T Waves: T waves normal  Clinical impression: normal ECG                 ED Course  ED Course as of Jan 16 2329   Thu Jan 16, 2020 2314 Reevaluation the patient is stable has no wheezing or rhonchi.  Oxygen saturation is stable and patient will be discharged home    [KW]      ED Course User Index  [KW] Poppy Herr APRN      /63   Pulse 67   Temp 97.7 °F (36.5 °C)   Resp 18   Ht 167.6 cm (66\")   Wt 79.4 kg (175 lb 0.7 oz)   LMP  (LMP Unknown)   SpO2 95%   BMI 28.25 kg/m²   Labs Reviewed   PROTIME-INR - Abnormal; Notable for the following components:       Result Value    INR 1.12 (*)     All other components within normal limits   CBC WITH AUTO DIFFERENTIAL - Abnormal; Notable for the following components:    MCV 97.3 (*)     MCH 35.4 (*)     MCHC 36.4 (*)     All other components within normal limits   BNP (IN-HOUSE) - Normal    Narrative:     Among patients with dyspnea, NT-proBNP is " highly sensitive for the detection of acute congestive heart failure. In addition NT-proBNP of <300 pg/ml effectively rules out acute congestive heart failure with 99% negative predictive value.    Results may be falsely decreased if patient taking Biotin.     COMPREHENSIVE METABOLIC PANEL   TROPONIN (IN-HOUSE)   CBC AND DIFFERENTIAL    Narrative:     The following orders were created for panel order CBC & Differential.  Procedure                               Abnormality         Status                     ---------                               -----------         ------                     CBC Auto Differential[[id number]]        Abnormal            Final result                 Please view results for these tests on the individual orders.     Medications   sodium chloride 0.9 % flush 10 mL (has no administration in time range)   ipratropium-albuterol (DUO-NEB) nebulizer solution 3 mL (3 mL Nebulization Given 1/16/20 2146)   methylPREDNISolone sodium succinate (SOLU-Medrol) injection 125 mg (125 mg Intravenous Given 1/16/20 2155)     Xr Chest 1 View    Result Date: 1/16/2020   1. Chronic changes are noted about the chest. There is no obvious pneumonia or gross congestive change   Electronically Signed By-Balta Lyon On:1/16/2020 9:50 PM This report was finalized on [id number] by  Balta Lyon, .                                             MDM  Number of Diagnoses or Management Options  Diagnosis management comments: Patient had IV established and blood work was obtained she was given a DuoNeb and 125 Solu-Medrol chest x-ray showed no pneumonia lab work normal.  On reevaluation the patient is resting comfortably in no acute distress her wheezing has subsided and the patient will be discharged home on some prednisone and given a prescription for albuterol as she left her Combivent inhaler in [place] when visiting her daughter and insurance will not pay for refill at this time.       Amount and/or  Complexity of Data Reviewed  Clinical lab tests: reviewed  Tests in the radiology section of CPT®: reviewed  Tests in the medicine section of CPT®: reviewed    Patient Progress  Patient progress: improved      Final diagnoses:   COPD exacerbation (CMS/[facility])   Dyspnea, unspecified type            Poppy Herr, APRN  01/16/20 7572

## 2020-01-17 NOTE — ED NOTES
Pt reports feeling soa for a couple of hours in spite of inhalers and nebulizer treatments. Reports having left a rescue inhaler in [place] after visiting daughter.      Kayley Cage RN  01/16/20 1837

## 2020-01-21 NOTE — TELEPHONE ENCOUNTER
Please call patient and let her know that she needs to be on a daily, maintenance inhaler like Incruse, Symbicort, Breo, Dulera, Spiriva, or Advair to help control COPD symptoms. Without a maintenance inhaler, patient is vulnerable to respiratory infections and COPD exacerbations. Patient has had 3 COPD exacerbations since late October 2019.     Combivent is an emergency inhaler that is to be used as needed for shortness of breath or wheezing.     Most maintenance inhalers are expensive. Advair is one of the cheapest maintenance inhalers, $. I have sent a prescription to patient's pharmacy. If inhalers are not covered by patient's insurance and out of pocket cost is not feasible, we can provide patient with samples or apply for patient assistance programs.     Signature    Alessandra Pina MD  Family Medicine  [facility]        This document has been electronically signed by Alessandra Pina MD on January 20, 2020 9:20 PM

## 2020-01-28 NOTE — TELEPHONE ENCOUNTER
Patient with COPD and a history of multiple acute exacerbations refusing maintenance inhaler. Prefers rescue inhaler.

## 2020-01-31 RX ORDER — ALBUTEROL SULFATE 90 UG/1
2 AEROSOL, METERED RESPIRATORY (INHALATION) EVERY 6 HOURS PRN
Qty: 18 INHALER | Refills: 5 | Status: SHIPPED | OUTPATIENT
Start: 2020-01-31 | End: 2020-06-10

## 2020-02-18 ENCOUNTER — OFFICE VISIT (OUTPATIENT)
Dept: PAIN MEDICINE | Facility: CLINIC | Age: 70
End: 2020-02-18

## 2020-02-18 VITALS
TEMPERATURE: 98 F | SYSTOLIC BLOOD PRESSURE: 138 MMHG | OXYGEN SATURATION: 93 % | WEIGHT: 168 LBS | HEART RATE: 72 BPM | DIASTOLIC BLOOD PRESSURE: 77 MMHG | HEIGHT: 66 IN | BODY MASS INDEX: 27 KG/M2 | RESPIRATION RATE: 16 BRPM

## 2020-02-18 DIAGNOSIS — G89.29 CHRONIC BILATERAL LOW BACK PAIN, UNSPECIFIED WHETHER SCIATICA PRESENT: Primary | ICD-10-CM

## 2020-02-18 DIAGNOSIS — M48.02 CERVICAL STENOSIS OF SPINAL CANAL: ICD-10-CM

## 2020-02-18 DIAGNOSIS — M53.3 SACROILIAC JOINT PAIN: ICD-10-CM

## 2020-02-18 DIAGNOSIS — M48.061 SPINAL STENOSIS OF LUMBAR REGION, UNSPECIFIED WHETHER NEUROGENIC CLAUDICATION PRESENT: ICD-10-CM

## 2020-02-18 DIAGNOSIS — M47.817 LUMBOSACRAL SPONDYLOSIS WITHOUT MYELOPATHY: ICD-10-CM

## 2020-02-18 DIAGNOSIS — M25.512 CHRONIC LEFT SHOULDER PAIN: ICD-10-CM

## 2020-02-18 DIAGNOSIS — M79.7 FIBROMYOSITIS: ICD-10-CM

## 2020-02-18 DIAGNOSIS — M47.812 CERVICAL SPONDYLOSIS WITHOUT MYELOPATHY: ICD-10-CM

## 2020-02-18 DIAGNOSIS — M25.551 RIGHT HIP PAIN: ICD-10-CM

## 2020-02-18 DIAGNOSIS — M54.50 CHRONIC BILATERAL LOW BACK PAIN, UNSPECIFIED WHETHER SCIATICA PRESENT: Primary | ICD-10-CM

## 2020-02-18 DIAGNOSIS — G89.29 NECK PAIN, CHRONIC: ICD-10-CM

## 2020-02-18 DIAGNOSIS — M54.2 NECK PAIN, CHRONIC: ICD-10-CM

## 2020-02-18 DIAGNOSIS — G89.29 CHRONIC LEFT SHOULDER PAIN: ICD-10-CM

## 2020-02-18 DIAGNOSIS — G89.4 CHRONIC PAIN DISORDER: ICD-10-CM

## 2020-02-18 PROCEDURE — G0463 HOSPITAL OUTPT CLINIC VISIT: HCPCS | Performed by: PHYSICAL MEDICINE & REHABILITATION

## 2020-02-18 PROCEDURE — 99214 OFFICE O/P EST MOD 30 MIN: CPT | Performed by: PHYSICAL MEDICINE & REHABILITATION

## 2020-02-18 NOTE — PROGRESS NOTES
"Subjective   Padmini Holt is a 69 y.o. female.     All over pain, 10/10 at worst, 7/10 at best, 8/10 today, always present, varies, aching, stabbing, worse with all activity, interferes with ADLs, sleep, quality of life, failed meds, injections, surgery, PT. Had multiple injections with Dr. Villalpando and Roddy. Imaging with R TKA, severe L knee OA, full thickness RTC tear. Prior notes reviewed, as abov, with referral for pain management, started on Norco 7.5/325mg BID, then TID, helps but not strong enough, then 10/325mg QID prn. Tried rotating to Percocet with headaches, stopped, restarted, doing well. Had 2 b/l Supartz injections. Poor sleep with pineal gland tumor, has failed \"everything.\" Worsening R hip pain, does not want LESLY. Percocet not lasting 6 hours.       The following portions of the patient's history were reviewed and updated as appropriate: allergies, current medications, past family history, past medical history, past social history, past surgical history and problem list.    Review of Systems   Constitutional: Positive for fatigue. Negative for chills and fever.   HENT: Positive for hearing loss. Negative for trouble swallowing.    Eyes: Positive for visual disturbance.   Respiratory: Positive for shortness of breath.    Cardiovascular: Positive for chest pain.   Gastrointestinal: Positive for abdominal pain and nausea. Negative for constipation, diarrhea and vomiting.   Genitourinary: Negative for urinary incontinence.   Musculoskeletal: Positive for arthralgias, back pain, joint swelling and neck pain. Negative for myalgias.   Neurological: Positive for dizziness and headache. Negative for weakness and numbness.       Objective   Physical Exam   Constitutional: She is oriented to person, place, and time. She appears well-developed and well-nourished.   HENT:   Head: Normocephalic and atraumatic.   Eyes: Pupils are equal, round, and reactive to light. EOM are normal.   Neck: Normal range of motion. "   Cardiovascular: Normal rate, regular rhythm, normal heart sounds and intact distal pulses.   Pulmonary/Chest: Breath sounds normal.   Abdominal: Soft. Bowel sounds are normal. She exhibits no distension. There is no tenderness.   Neurological: She is alert and oriented to person, place, and time. She has normal strength and normal reflexes. She displays normal reflexes. No sensory deficit.   Psychiatric: She has a normal mood and affect. Her behavior is normal. Thought content normal.         Assessment/Plan   Padmini was seen today for back pain.    Diagnoses and all orders for this visit:    Chronic bilateral low back pain, unspecified whether sciatica present    Chronic pain disorder    Neck pain, chronic    Chronic left shoulder pain    Sacroiliac joint pain    Cervical spondylosis without myelopathy    Fibromyositis    Lumbosacral spondylosis without myelopathy    Cervical stenosis of spinal canal    Spinal stenosis of lumbar region, unspecified whether neurogenic claudication present        INspect in order. Mod risk per SOAPP. UDS 8/13/19 in order.  Increased to Norco 10/325mg TID prn, then QID, stopped. Began Percocet 10mg QID prn, rotate to Xtampza 18mg BID, was taking as high as Oxycodone 15mg TID in recent past.   Failed Diclofenac cream. Reordered RxAlt #1 cream.  Cont other meds as prescribed.  Referred to Rheum for prior dz of autoimmune disease.  Failed PT, surgery, injections.  Ordered  PTartemio for other needs.  Had 2nd of 3 L knee Supartz injections, failed steroids in past. Is s/p R TKA.  May need to inject b/l [facility] thumb joints.  Referral to Gayle Pate to artemio for revision of R TKA.  Schedule R hip intra-articular injection.  RTC for injection.

## 2020-03-16 ENCOUNTER — TELEPHONE (OUTPATIENT)
Dept: PAIN MEDICINE | Facility: CLINIC | Age: 70
End: 2020-03-16

## 2020-03-16 DIAGNOSIS — M54.50 CHRONIC BILATERAL LOW BACK PAIN, UNSPECIFIED WHETHER SCIATICA PRESENT: ICD-10-CM

## 2020-03-16 DIAGNOSIS — G89.29 CHRONIC BILATERAL LOW BACK PAIN, UNSPECIFIED WHETHER SCIATICA PRESENT: ICD-10-CM

## 2020-03-19 ENCOUNTER — APPOINTMENT (OUTPATIENT)
Dept: PAIN MEDICINE | Facility: HOSPITAL | Age: 70
End: 2020-03-19

## 2020-03-20 ENCOUNTER — TELEPHONE (OUTPATIENT)
Dept: PAIN MEDICINE | Facility: CLINIC | Age: 70
End: 2020-03-20

## 2020-03-20 NOTE — TELEPHONE ENCOUNTER
Pt called left voice message-- wanted to know if you could escribe her  Narcotic meds to [facility]-- she has no immune system and is 70 yrs old  And is not getting out  She stated--- Xtampza Er 18mg takes one every 12 hours ----- told her you are out till Tuesday-- she will call back and see if you will escribe----- will put inspect on your desk for you if you will---- Pts call back # is [phone]-- please advise-tks

## 2020-03-23 NOTE — TELEPHONE ENCOUNTER
Yes, I can. Can you send this note back to me so I can use it for the prescription tomorrow? Thanks.

## 2020-03-24 ENCOUNTER — TELEPHONE (OUTPATIENT)
Dept: PAIN MEDICINE | Facility: CLINIC | Age: 70
End: 2020-03-24

## 2020-03-24 DIAGNOSIS — M54.50 CHRONIC BILATERAL LOW BACK PAIN, UNSPECIFIED WHETHER SCIATICA PRESENT: ICD-10-CM

## 2020-03-24 DIAGNOSIS — G89.29 CHRONIC BILATERAL LOW BACK PAIN, UNSPECIFIED WHETHER SCIATICA PRESENT: ICD-10-CM

## 2020-03-24 RX ORDER — ONDANSETRON HYDROCHLORIDE 8 MG/1
8 TABLET, FILM COATED ORAL EVERY 8 HOURS PRN
Qty: 30 TABLET | Refills: 1 | Status: SHIPPED | OUTPATIENT
Start: 2020-03-24 | End: 2020-05-15 | Stop reason: SDUPTHER

## 2020-04-24 ENCOUNTER — TELEPHONE (OUTPATIENT)
Dept: PAIN MEDICINE | Facility: CLINIC | Age: 70
End: 2020-04-24

## 2020-04-24 DIAGNOSIS — M54.50 CHRONIC BILATERAL LOW BACK PAIN, UNSPECIFIED WHETHER SCIATICA PRESENT: ICD-10-CM

## 2020-04-24 DIAGNOSIS — G89.29 CHRONIC BILATERAL LOW BACK PAIN, UNSPECIFIED WHETHER SCIATICA PRESENT: ICD-10-CM

## 2020-04-28 DIAGNOSIS — J44.9 ASTHMA WITH COPD (HCC): Primary | ICD-10-CM

## 2020-05-15 DIAGNOSIS — I10 ESSENTIAL HYPERTENSION: Primary | ICD-10-CM

## 2020-05-15 RX ORDER — BENZONATATE 100 MG/1
100 CAPSULE ORAL 2 TIMES DAILY PRN
Qty: 30 CAPSULE | Refills: 0 | Status: SHIPPED | OUTPATIENT
Start: 2020-05-15 | End: 2021-09-10

## 2020-05-15 RX ORDER — ONDANSETRON HYDROCHLORIDE 8 MG/1
8 TABLET, FILM COATED ORAL EVERY 8 HOURS PRN
Qty: 30 TABLET | Refills: 0 | Status: SHIPPED | OUTPATIENT
Start: 2020-05-15 | End: 2020-09-09

## 2020-05-15 RX ORDER — ATENOLOL 50 MG/1
50 TABLET ORAL 2 TIMES DAILY
Qty: 60 TABLET | Refills: 5 | Status: SHIPPED | OUTPATIENT
Start: 2020-05-15 | End: 2020-08-26

## 2020-05-15 NOTE — TELEPHONE ENCOUNTER
Pt called for refills on Atenolol and Ondansetron. Also asking for a refill on Benzonatate 100mg 1 capsule three times a day for cough. Not on current med list. Has allergies and gets a cough.

## 2020-05-22 ENCOUNTER — OFFICE VISIT (OUTPATIENT)
Dept: PAIN MEDICINE | Facility: CLINIC | Age: 70
End: 2020-05-22

## 2020-05-22 DIAGNOSIS — G89.29 NECK PAIN, CHRONIC: ICD-10-CM

## 2020-05-22 DIAGNOSIS — M47.817 LUMBOSACRAL SPONDYLOSIS WITHOUT MYELOPATHY: ICD-10-CM

## 2020-05-22 DIAGNOSIS — G89.29 CHRONIC LEFT SHOULDER PAIN: ICD-10-CM

## 2020-05-22 DIAGNOSIS — G89.4 CHRONIC PAIN DISORDER: ICD-10-CM

## 2020-05-22 DIAGNOSIS — M53.3 SACROILIAC JOINT PAIN: ICD-10-CM

## 2020-05-22 DIAGNOSIS — M47.812 CERVICAL SPONDYLOSIS WITHOUT MYELOPATHY: ICD-10-CM

## 2020-05-22 DIAGNOSIS — M48.02 CERVICAL STENOSIS OF SPINAL CANAL: ICD-10-CM

## 2020-05-22 DIAGNOSIS — M54.50 CHRONIC BILATERAL LOW BACK PAIN, UNSPECIFIED WHETHER SCIATICA PRESENT: Primary | ICD-10-CM

## 2020-05-22 DIAGNOSIS — M25.512 CHRONIC LEFT SHOULDER PAIN: ICD-10-CM

## 2020-05-22 DIAGNOSIS — M25.551 RIGHT HIP PAIN: ICD-10-CM

## 2020-05-22 DIAGNOSIS — G89.29 CHRONIC BILATERAL LOW BACK PAIN, UNSPECIFIED WHETHER SCIATICA PRESENT: Primary | ICD-10-CM

## 2020-05-22 DIAGNOSIS — M54.2 NECK PAIN, CHRONIC: ICD-10-CM

## 2020-05-22 DIAGNOSIS — M48.061 SPINAL STENOSIS OF LUMBAR REGION, UNSPECIFIED WHETHER NEUROGENIC CLAUDICATION PRESENT: ICD-10-CM

## 2020-05-22 DIAGNOSIS — M79.7 FIBROMYOSITIS: ICD-10-CM

## 2020-05-22 PROCEDURE — 99441 PR PHYS/QHP TELEPHONE EVALUATION 5-10 MIN: CPT | Performed by: PHYSICAL MEDICINE & REHABILITATION

## 2020-05-22 RX ORDER — MORPHINE SULFATE 15 MG/1
15 TABLET, FILM COATED, EXTENDED RELEASE ORAL EVERY 8 HOURS
Qty: 90 TABLET | Refills: 0 | Status: SHIPPED | OUTPATIENT
Start: 2020-05-22 | End: 2020-08-27

## 2020-05-22 NOTE — PROGRESS NOTES
"Subjective   Padmini Holt is a 69 y.o. female.     All over pain, 10/10 at worst, 7/10 at best, 8/10 today, always present, varies, aching, stabbing, worse with all activity, interferes with ADLs, sleep, quality of life, failed meds, injections, surgery, PT. Had multiple injections with Dr. Villalpando and Roddy. Imaging with R TKA, severe L knee OA, full thickness RTC tear. Prior notes reviewed, as abov, with referral for pain management, started on Norco 7.5/325mg BID, then TID, helps but not strong enough, then 10/325mg QID prn. Tried rotating to Percocet with headaches, stopped, restarted, doing well. Had 2 b/l Supartz injections. Poor sleep with pineal gland tumor, has failed \"everything.\" Worsening R hip pain, does not want LESLY. Percocet not lasting 6 hours. Now Xtampza 18mg BID. Pain overall worsening.       The following portions of the patient's history were reviewed and updated as appropriate: allergies, current medications, past family history, past medical history, past social history, past surgical history and problem list.    Review of Systems   Constitutional: Positive for fatigue. Negative for chills and fever.   HENT: Positive for hearing loss. Negative for trouble swallowing.    Eyes: Positive for visual disturbance.   Respiratory: Positive for shortness of breath.    Cardiovascular: Positive for chest pain.   Gastrointestinal: Positive for abdominal pain and nausea. Negative for constipation, diarrhea and vomiting.   Genitourinary: Negative for urinary incontinence.   Musculoskeletal: Positive for arthralgias, back pain, joint swelling and neck pain. Negative for myalgias.   Neurological: Positive for dizziness and headache. Negative for weakness and numbness.       Objective   Physical Exam   Constitutional: She is oriented to person, place, and time.   Neurological: She is alert and oriented to person, place, and time. She has normal strength and normal reflexes.   Psychiatric: She has a normal mood " and affect. Her behavior is normal. Thought content normal.         Assessment/Plan   Padmini was seen today for pain.    Diagnoses and all orders for this visit:    Chronic bilateral low back pain, unspecified whether sciatica present    Chronic pain disorder    Neck pain, chronic    Chronic left shoulder pain    Right hip pain    Sacroiliac joint pain    Cervical spondylosis without myelopathy    Fibromyositis    Lumbosacral spondylosis without myelopathy    Cervical stenosis of spinal canal    Spinal stenosis of lumbar region, unspecified whether neurogenic claudication present        INspect in order. Mod risk per SOAPP. UDS 8/13/19 in order.  Increased to Norco 10/325mg TID prn, then QID, stopped. Began Percocet 10mg QID prn, rotated to Xtampza 18mg BID, tolerant. Begin MS-Contin 15mg TID. Was taking as high as Oxycodone 15mg TID in recent past.   Failed Diclofenac cream. Reordered RxAlt #1 cream.  Cont other meds as prescribed.  Referred to Rheum for prior dz of autoimmune disease.  Failed PT, surgery, injections.  Ordered  PT, eval for other needs.  Had 2nd of 3 L knee Supartz injections, failed steroids in past. Is s/p R TKA.  May need to inject b/l CMC thumb joints.  Referral to Gayle Pate to eval for revision of R TKA.  Schedule R hip intra-articular injection.  RTC for injection. Telephone visit, spent 6 minutes discussing her worsening pain, medication change.

## 2020-05-22 NOTE — PROGRESS NOTES
You have chosen to receive care through a telephone visit. Do you consent to use a telephone visit for your medical care today? Yes

## 2020-06-05 ENCOUNTER — APPOINTMENT (OUTPATIENT)
Dept: PAIN MEDICINE | Facility: HOSPITAL | Age: 70
End: 2020-06-05

## 2020-06-10 RX ORDER — ALBUTEROL SULFATE 90 UG/1
AEROSOL, METERED RESPIRATORY (INHALATION)
Qty: 18 INHALER | Refills: 5 | Status: SHIPPED | OUTPATIENT
Start: 2020-06-10 | End: 2020-10-27 | Stop reason: SDUPTHER

## 2020-08-26 DIAGNOSIS — I10 ESSENTIAL HYPERTENSION: ICD-10-CM

## 2020-08-26 RX ORDER — ATENOLOL 50 MG/1
50 TABLET ORAL 2 TIMES DAILY
Qty: 180 TABLET | Refills: 1 | Status: SHIPPED | OUTPATIENT
Start: 2020-08-26 | End: 2020-10-27 | Stop reason: SDUPTHER

## 2020-08-27 ENCOUNTER — OFFICE VISIT (OUTPATIENT)
Dept: PAIN MEDICINE | Facility: CLINIC | Age: 70
End: 2020-08-27

## 2020-08-27 VITALS — RESPIRATION RATE: 16 BRPM | BODY MASS INDEX: 27 KG/M2 | WEIGHT: 172 LBS | HEIGHT: 67 IN

## 2020-08-27 DIAGNOSIS — M48.061 SPINAL STENOSIS OF LUMBAR REGION, UNSPECIFIED WHETHER NEUROGENIC CLAUDICATION PRESENT: ICD-10-CM

## 2020-08-27 DIAGNOSIS — G89.4 CHRONIC PAIN DISORDER: ICD-10-CM

## 2020-08-27 DIAGNOSIS — M48.02 CERVICAL STENOSIS OF SPINAL CANAL: ICD-10-CM

## 2020-08-27 DIAGNOSIS — M53.3 SACROILIAC JOINT PAIN: ICD-10-CM

## 2020-08-27 DIAGNOSIS — M47.812 CERVICAL SPONDYLOSIS WITHOUT MYELOPATHY: ICD-10-CM

## 2020-08-27 DIAGNOSIS — M47.817 LUMBOSACRAL SPONDYLOSIS WITHOUT MYELOPATHY: ICD-10-CM

## 2020-08-27 DIAGNOSIS — M54.50 CHRONIC BILATERAL LOW BACK PAIN, UNSPECIFIED WHETHER SCIATICA PRESENT: Primary | ICD-10-CM

## 2020-08-27 DIAGNOSIS — M54.2 NECK PAIN, CHRONIC: ICD-10-CM

## 2020-08-27 DIAGNOSIS — G89.29 CHRONIC LEFT SHOULDER PAIN: ICD-10-CM

## 2020-08-27 DIAGNOSIS — M25.551 RIGHT HIP PAIN: ICD-10-CM

## 2020-08-27 DIAGNOSIS — M25.512 CHRONIC LEFT SHOULDER PAIN: ICD-10-CM

## 2020-08-27 DIAGNOSIS — M79.7 FIBROMYOSITIS: ICD-10-CM

## 2020-08-27 DIAGNOSIS — G89.29 CHRONIC BILATERAL LOW BACK PAIN, UNSPECIFIED WHETHER SCIATICA PRESENT: Primary | ICD-10-CM

## 2020-08-27 DIAGNOSIS — G89.29 NECK PAIN, CHRONIC: ICD-10-CM

## 2020-08-27 PROCEDURE — 99441 PR PHYS/QHP TELEPHONE EVALUATION 5-10 MIN: CPT | Performed by: PHYSICAL MEDICINE & REHABILITATION

## 2020-08-27 RX ORDER — QUETIAPINE FUMARATE 300 MG/1
TABLET, FILM COATED ORAL
COMMUNITY
Start: 2020-08-24 | End: 2021-09-10

## 2020-08-27 NOTE — PROGRESS NOTES
"Subjective   Padmini Holt is a 69 y.o. female.     All over pain, 10/10 at worst, 7/10 at best, 8/10 today, always present, varies, aching, stabbing, worse with all activity, interferes with ADLs, sleep, quality of life, failed meds, injections, surgery, PT. Had multiple injections with Dr. Villalpando and Roddy. Imaging with R TKA, severe L knee OA, full thickness RTC tear. Prior notes reviewed, as abov, with referral for pain management, started on Norco 7.5/325mg BID, then TID, helps but not strong enough, then 10/325mg QID prn. Tried rotating to Percocet with headaches, stopped, restarted, doing well. Had 2 b/l Supartz injections. Poor sleep with pineal gland tumor, has failed \"everything.\" Worsening R hip pain, does not want LESLY. Percocet not lasting 6 hours. Now Xtampza 18mg BID. Pain overall worsening.       The following portions of the patient's history were reviewed and updated as appropriate: allergies, current medications, past family history, past medical history, past social history, past surgical history and problem list.    Review of Systems   Constitutional: Positive for fatigue. Negative for chills and fever.   HENT: Positive for hearing loss. Negative for trouble swallowing.    Eyes: Positive for visual disturbance.   Respiratory: Positive for shortness of breath.    Cardiovascular: Positive for chest pain.   Gastrointestinal: Positive for abdominal pain and nausea. Negative for constipation, diarrhea and vomiting.   Genitourinary: Negative for urinary incontinence.   Musculoskeletal: Positive for arthralgias, back pain, joint swelling and neck pain. Negative for myalgias.   Neurological: Positive for dizziness and headache. Negative for weakness and numbness.       Objective   Physical Exam   Constitutional: She is oriented to person, place, and time.   Neurological: She is alert and oriented to person, place, and time. She has normal strength and normal reflexes.   Psychiatric: She has a normal mood " and affect. Her behavior is normal. Thought content normal.         Assessment/Plan   Padmini was seen today for knee pain, hip pain, foot pain, generalized body aches and neck pain.    Diagnoses and all orders for this visit:    Chronic bilateral low back pain, unspecified whether sciatica present    Chronic pain disorder    Neck pain, chronic    Chronic left shoulder pain    Right hip pain    Sacroiliac joint pain    Cervical spondylosis without myelopathy    Fibromyositis    Lumbosacral spondylosis without myelopathy    Cervical stenosis of spinal canal    Spinal stenosis of lumbar region, unspecified whether neurogenic claudication present        INspect in order. Mod risk per SOAPP. UDS 8/13/19 in order.  Increased to Norco 10/325mg TID prn, then QID, stopped. Began Percocet 10mg QID prn, rotated to Xtampza 18mg BID, tolerant. Rotated to MS-Contin 15mg TID, less effective, stop, restart Xtampza 18mg BID. Was taking as high as Oxycodone 15mg TID in recent past.   Failed Diclofenac cream. Reordered RxAlt #1 cream.  Cont other meds as prescribed.  Referred to Rheum for prior dz of autoimmune disease.  Failed PT, surgery, injections.  Ordered  PT, artemio for other needs.  Had 2nd of 3 L knee Supartz injections, failed steroids in past. Is s/p R TKA.  May need to inject b/l [facility] thumb joints.  Referral to [facility]te to artemio for revision of R TKA.  Schedule R hip intra-articular injection.  RTC for injection. Telephone visit, spent 5 minutes discussing her worsening pain, medication change.

## 2020-09-09 RX ORDER — ONDANSETRON HYDROCHLORIDE 8 MG/1
TABLET, FILM COATED ORAL
Qty: 30 TABLET | Refills: 0 | Status: SHIPPED | OUTPATIENT
Start: 2020-09-09 | End: 2021-01-04

## 2020-09-11 DIAGNOSIS — J44.9 ASTHMA WITH COPD (HCC): ICD-10-CM

## 2020-09-24 ENCOUNTER — OFFICE VISIT (OUTPATIENT)
Dept: PAIN MEDICINE | Facility: CLINIC | Age: 70
End: 2020-09-24

## 2020-09-24 VITALS — HEIGHT: 66 IN | BODY MASS INDEX: 26.36 KG/M2 | WEIGHT: 164 LBS | RESPIRATION RATE: 16 BRPM

## 2020-09-24 DIAGNOSIS — M47.817 LUMBOSACRAL SPONDYLOSIS WITHOUT MYELOPATHY: ICD-10-CM

## 2020-09-24 DIAGNOSIS — G89.29 CHRONIC BILATERAL LOW BACK PAIN, UNSPECIFIED WHETHER SCIATICA PRESENT: Primary | ICD-10-CM

## 2020-09-24 DIAGNOSIS — M25.551 RIGHT HIP PAIN: ICD-10-CM

## 2020-09-24 DIAGNOSIS — G89.29 NECK PAIN, CHRONIC: ICD-10-CM

## 2020-09-24 DIAGNOSIS — M54.2 NECK PAIN, CHRONIC: ICD-10-CM

## 2020-09-24 DIAGNOSIS — G89.29 CHRONIC LEFT SHOULDER PAIN: ICD-10-CM

## 2020-09-24 DIAGNOSIS — M53.3 SACROILIAC JOINT PAIN: ICD-10-CM

## 2020-09-24 DIAGNOSIS — G89.4 CHRONIC PAIN DISORDER: ICD-10-CM

## 2020-09-24 DIAGNOSIS — M54.50 CHRONIC BILATERAL LOW BACK PAIN, UNSPECIFIED WHETHER SCIATICA PRESENT: Primary | ICD-10-CM

## 2020-09-24 DIAGNOSIS — M48.02 CERVICAL STENOSIS OF SPINAL CANAL: ICD-10-CM

## 2020-09-24 DIAGNOSIS — M79.7 FIBROMYOSITIS: ICD-10-CM

## 2020-09-24 DIAGNOSIS — M48.061 SPINAL STENOSIS OF LUMBAR REGION, UNSPECIFIED WHETHER NEUROGENIC CLAUDICATION PRESENT: ICD-10-CM

## 2020-09-24 DIAGNOSIS — M47.812 CERVICAL SPONDYLOSIS WITHOUT MYELOPATHY: ICD-10-CM

## 2020-09-24 DIAGNOSIS — M25.512 CHRONIC LEFT SHOULDER PAIN: ICD-10-CM

## 2020-09-24 PROCEDURE — 99441 PR PHYS/QHP TELEPHONE EVALUATION 5-10 MIN: CPT | Performed by: PHYSICAL MEDICINE & REHABILITATION

## 2020-09-24 RX ORDER — OXYCODONE AND ACETAMINOPHEN 10; 325 MG/1; MG/1
1 TABLET ORAL EVERY 6 HOURS PRN
Qty: 120 TABLET | Refills: 0 | Status: SHIPPED | OUTPATIENT
Start: 2020-09-24 | End: 2020-11-05 | Stop reason: SDUPTHER

## 2020-09-24 RX ORDER — TRAZODONE HYDROCHLORIDE 150 MG/1
150 TABLET ORAL
COMMUNITY
Start: 2020-09-01 | End: 2021-09-10

## 2020-10-09 ENCOUNTER — TELEPHONE (OUTPATIENT)
Dept: FAMILY MEDICINE CLINIC | Facility: CLINIC | Age: 70
End: 2020-10-09

## 2020-10-09 NOTE — TELEPHONE ENCOUNTER
If patient is having this much difficulty breathing she needs to be evaluated in the ER.  If she is needing home oxygen they can evaluate this in the hospital. Hose 3 meds, especially if combined can decrease her respiratory rate. She really needs to go to the ER

## 2020-10-09 NOTE — TELEPHONE ENCOUNTER
Takes care of her  and is having a hard time breathing. Due to insurance it will cost her $600 if she fills Combivent now. She has read that   3 of her meds can cause the person to have shallow breathing: Oxycodone,Trazodone,Alprazolam. She has soiled her pants when she has a hard time breathing. She cannot control her bowels with she is having a hard time breathing. Her appt is 10/27. What would you like pt to do until appt. She would like Inogen. She is hoping insurance will cover cost. She has been notified that Dr. Pina is out and would like some instruction for what to do. Feels she cannot wait until Monday.

## 2020-10-12 ENCOUNTER — TELEPHONE (OUTPATIENT)
Dept: FAMILY MEDICINE CLINIC | Facility: CLINIC | Age: 70
End: 2020-10-12

## 2020-10-12 DIAGNOSIS — J44.1 ACUTE EXACERBATION OF CHRONIC OBSTRUCTIVE PULMONARY DISEASE (COPD) (HCC): Primary | ICD-10-CM

## 2020-10-12 RX ORDER — PREDNISONE 20 MG/1
40 TABLET ORAL DAILY
Qty: 10 TABLET | Refills: 0 | Status: SHIPPED | OUTPATIENT
Start: 2020-10-12 | End: 2020-10-17

## 2020-10-12 RX ORDER — SULFAMETHOXAZOLE AND TRIMETHOPRIM 800; 160 MG/1; MG/1
1 TABLET ORAL 2 TIMES DAILY
Qty: 10 TABLET | Refills: 0 | Status: SHIPPED | OUTPATIENT
Start: 2020-10-12 | End: 2020-10-17

## 2020-10-12 NOTE — TELEPHONE ENCOUNTER
Please call patient and let her know that she needs to be on an daily maintenance inhaler to help control her symptoms.  She will need to come in the office to  samples and an application for patient assistance program.  Combivent will not control her symptoms and should only be used as needed for shortness of breath.    I will send a prescription for steroids and antibiotics to her pharmacy to help with a COPD exacerbation that she may be experiencing.  If she is not feeling well, I would recommend going to the ER for further evaluation.

## 2020-10-12 NOTE — TELEPHONE ENCOUNTER
She is out of all of her inhalers. I asked her to verify which inhalers she is using and when/how often:    Combivent- when she wakes up and uses it until she gets the phlegm up  Albuterol sulfate- cannot tell me  Ventolin- cannot tell me    She uses her inhalers until she gets relief then she stops using them.   She said she is sick and doesn't just have breathing problems and Dr. Hanna put her on Oxycodone because she is in so much pain. She states she does not and will not abuse medication but she is not well.   Phonitive - [facility] prescribes her Xanax.

## 2020-10-12 NOTE — TELEPHONE ENCOUNTER
Pt called asking for inhaler samples. She would like to speak with you about getting an Inogen for continuous oxygen. Has appt on 10/27/2020. She has been having a difficult time breathing. Please see phone note from 10/09/2020. Is it okay to give samples? If so, which inhaler?

## 2020-10-12 NOTE — TELEPHONE ENCOUNTER
Please call patient and let her know that COPD is a chronic degenerative disease.  She needs to be using her Advair (fluticosone-salmeterol) inhaler daily in order to help control COPD symptoms and potentially slow down the progression.  Advair is one of the cheaper maintenance inhalers.  It costs about $83 out-of-pocket with a good Rx coupon at [facility].  How long has she been without Advair?  We have samples of other maintenance inhalers that she can use if she cannot afford Advair. Additionally I have an application for her to complete for a patient assistance program.  She is to use Ventolin (albuterol) or Combivent as needed for shortness of breath. Albuterol and Combivent will not help control symptoms.    Oxygen therapy cannot be started without documentation and proof of low oxygen levels.  Unnecessary oxygen use can decrease respiratory drive.  We can check her oxygen levels at her next office visit.  If she does not feel that she can make it to her next office visit, I would recommend going to the ED for further evaluation.    She is correct when reporting that Xanax and Percocet can decrease her respiratory drive.  She should discuss her Percocet prescription with Dr. Hanna.  Who is writing her Xanax prescription? I am not writing it and will not be writing it, especially if patient is experiencing trouble breathing.

## 2020-10-13 NOTE — TELEPHONE ENCOUNTER
Patient has been notified and samples are ready for her to . Patient assistance form is in the same bag as her samples.

## 2020-10-27 ENCOUNTER — LAB (OUTPATIENT)
Dept: FAMILY MEDICINE CLINIC | Facility: CLINIC | Age: 70
End: 2020-10-27

## 2020-10-27 ENCOUNTER — OFFICE VISIT (OUTPATIENT)
Dept: FAMILY MEDICINE CLINIC | Facility: CLINIC | Age: 70
End: 2020-10-27

## 2020-10-27 VITALS
WEIGHT: 167 LBS | TEMPERATURE: 97.1 F | HEART RATE: 75 BPM | OXYGEN SATURATION: 93 % | HEIGHT: 66 IN | SYSTOLIC BLOOD PRESSURE: 146 MMHG | DIASTOLIC BLOOD PRESSURE: 81 MMHG | BODY MASS INDEX: 26.84 KG/M2

## 2020-10-27 DIAGNOSIS — M47.819 MULTILEVEL SPONDYLOSIS: ICD-10-CM

## 2020-10-27 DIAGNOSIS — Z12.2 ENCOUNTER FOR SCREENING FOR MALIGNANT NEOPLASM OF LUNG: ICD-10-CM

## 2020-10-27 DIAGNOSIS — F43.12 CHRONIC POST-TRAUMATIC STRESS DISORDER (PTSD): ICD-10-CM

## 2020-10-27 DIAGNOSIS — F41.8 MIXED ANXIETY AND DEPRESSIVE DISORDER: ICD-10-CM

## 2020-10-27 DIAGNOSIS — J44.9 ASTHMA WITH COPD (HCC): Primary | ICD-10-CM

## 2020-10-27 DIAGNOSIS — Z87.891 PERSONAL HISTORY OF TOBACCO USE, PRESENTING HAZARDS TO HEALTH: ICD-10-CM

## 2020-10-27 DIAGNOSIS — B37.0 ORAL THRUSH: ICD-10-CM

## 2020-10-27 DIAGNOSIS — I10 ESSENTIAL HYPERTENSION: ICD-10-CM

## 2020-10-27 DIAGNOSIS — Z00.00 ENCOUNTER FOR MEDICARE ANNUAL WELLNESS EXAM: ICD-10-CM

## 2020-10-27 DIAGNOSIS — M81.0 AGE-RELATED OSTEOPOROSIS WITHOUT CURRENT PATHOLOGICAL FRACTURE: ICD-10-CM

## 2020-10-27 PROBLEM — H53.2 DIPLOPIA: Status: ACTIVE | Noted: 2019-04-08

## 2020-10-27 PROBLEM — H50.21 VERTICAL STRABISMUS, RIGHT EYE: Status: ACTIVE | Noted: 2019-04-08

## 2020-10-27 PROBLEM — H50.15 ALTERNATING EXOTROPIA: Status: ACTIVE | Noted: 2019-04-08

## 2020-10-27 PROBLEM — H25.13 AGE-RELATED NUCLEAR CATARACT OF BOTH EYES: Status: ACTIVE | Noted: 2019-04-08

## 2020-10-27 LAB
ALBUMIN SERPL-MCNC: 4.7 G/DL (ref 3.5–5.2)
ALBUMIN/GLOB SERPL: 2.1 G/DL
ALP SERPL-CCNC: 72 U/L (ref 39–117)
ALT SERPL W P-5'-P-CCNC: 20 U/L (ref 1–33)
ANION GAP SERPL CALCULATED.3IONS-SCNC: 11.7 MMOL/L (ref 5–15)
AST SERPL-CCNC: 20 U/L (ref 1–32)
BILIRUB SERPL-MCNC: 0.4 MG/DL (ref 0–1.2)
BUN SERPL-MCNC: 13 MG/DL (ref 8–23)
BUN/CREAT SERPL: 18.8 (ref 7–25)
CALCIUM SPEC-SCNC: 9.1 MG/DL (ref 8.6–10.5)
CHLORIDE SERPL-SCNC: 103 MMOL/L (ref 98–107)
CO2 SERPL-SCNC: 27.3 MMOL/L (ref 22–29)
CREAT SERPL-MCNC: 0.69 MG/DL (ref 0.57–1)
GFR SERPL CREATININE-BSD FRML MDRD: 84 ML/MIN/1.73
GLOBULIN UR ELPH-MCNC: 2.2 GM/DL
GLUCOSE SERPL-MCNC: 86 MG/DL (ref 65–99)
POTASSIUM SERPL-SCNC: 4.4 MMOL/L (ref 3.5–5.2)
PROT SERPL-MCNC: 6.9 G/DL (ref 6–8.5)
SODIUM SERPL-SCNC: 142 MMOL/L (ref 136–145)
TSH SERPL DL<=0.05 MIU/L-ACNC: 0.86 UIU/ML (ref 0.27–4.2)
VIT B12 BLD-MCNC: 160 PG/ML (ref 211–946)

## 2020-10-27 PROCEDURE — 99213 OFFICE O/P EST LOW 20 MIN: CPT | Performed by: FAMILY MEDICINE

## 2020-10-27 PROCEDURE — 82607 VITAMIN B-12: CPT | Performed by: FAMILY MEDICINE

## 2020-10-27 PROCEDURE — 36415 COLL VENOUS BLD VENIPUNCTURE: CPT | Performed by: FAMILY MEDICINE

## 2020-10-27 PROCEDURE — 80053 COMPREHEN METABOLIC PANEL: CPT | Performed by: FAMILY MEDICINE

## 2020-10-27 PROCEDURE — 84443 ASSAY THYROID STIM HORMONE: CPT | Performed by: FAMILY MEDICINE

## 2020-10-27 PROCEDURE — G0439 PPPS, SUBSEQ VISIT: HCPCS | Performed by: FAMILY MEDICINE

## 2020-10-27 RX ORDER — NICOTINE 21 MG/24HR
1 PATCH, TRANSDERMAL 24 HOURS TRANSDERMAL EVERY 24 HOURS
Qty: 30 EACH | Refills: 5 | Status: SHIPPED | OUTPATIENT
Start: 2020-10-27 | End: 2021-10-22

## 2020-10-27 RX ORDER — AMLODIPINE BESYLATE 10 MG/1
10 TABLET ORAL
Qty: 90 TABLET | Refills: 1 | Status: SHIPPED | OUTPATIENT
Start: 2020-10-27 | End: 2021-05-03

## 2020-10-27 RX ORDER — ATENOLOL 50 MG/1
50 TABLET ORAL 2 TIMES DAILY
Qty: 180 TABLET | Refills: 1 | Status: SHIPPED | OUTPATIENT
Start: 2020-10-27 | End: 2021-05-03

## 2020-11-05 ENCOUNTER — OFFICE VISIT (OUTPATIENT)
Dept: PAIN MEDICINE | Facility: CLINIC | Age: 70
End: 2020-11-05

## 2020-11-05 VITALS — WEIGHT: 165 LBS | BODY MASS INDEX: 26.52 KG/M2 | HEIGHT: 66 IN

## 2020-11-05 DIAGNOSIS — G89.4 CHRONIC PAIN DISORDER: ICD-10-CM

## 2020-11-05 DIAGNOSIS — G89.29 NECK PAIN, CHRONIC: ICD-10-CM

## 2020-11-05 DIAGNOSIS — M47.812 CERVICAL SPONDYLOSIS WITHOUT MYELOPATHY: ICD-10-CM

## 2020-11-05 DIAGNOSIS — M48.02 CERVICAL STENOSIS OF SPINAL CANAL: ICD-10-CM

## 2020-11-05 DIAGNOSIS — M79.7 FIBROMYOSITIS: ICD-10-CM

## 2020-11-05 DIAGNOSIS — G89.29 CHRONIC LEFT SHOULDER PAIN: ICD-10-CM

## 2020-11-05 DIAGNOSIS — M53.3 SACROILIAC JOINT PAIN: ICD-10-CM

## 2020-11-05 DIAGNOSIS — M54.2 NECK PAIN, CHRONIC: ICD-10-CM

## 2020-11-05 DIAGNOSIS — M25.551 RIGHT HIP PAIN: ICD-10-CM

## 2020-11-05 DIAGNOSIS — M47.817 LUMBOSACRAL SPONDYLOSIS WITHOUT MYELOPATHY: ICD-10-CM

## 2020-11-05 DIAGNOSIS — M54.50 CHRONIC BILATERAL LOW BACK PAIN, UNSPECIFIED WHETHER SCIATICA PRESENT: Primary | ICD-10-CM

## 2020-11-05 DIAGNOSIS — M48.061 SPINAL STENOSIS OF LUMBAR REGION, UNSPECIFIED WHETHER NEUROGENIC CLAUDICATION PRESENT: ICD-10-CM

## 2020-11-05 DIAGNOSIS — G89.29 CHRONIC BILATERAL LOW BACK PAIN, UNSPECIFIED WHETHER SCIATICA PRESENT: Primary | ICD-10-CM

## 2020-11-05 DIAGNOSIS — M25.512 CHRONIC LEFT SHOULDER PAIN: ICD-10-CM

## 2020-11-05 PROCEDURE — 99441 PR PHYS/QHP TELEPHONE EVALUATION 5-10 MIN: CPT | Performed by: PHYSICAL MEDICINE & REHABILITATION

## 2020-11-05 RX ORDER — OXYCODONE AND ACETAMINOPHEN 10; 325 MG/1; MG/1
1 TABLET ORAL EVERY 6 HOURS PRN
Qty: 120 TABLET | Refills: 0 | Status: SHIPPED | OUTPATIENT
Start: 2020-11-05 | End: 2021-01-29 | Stop reason: SDUPTHER

## 2020-11-05 NOTE — PROGRESS NOTES
"Subjective   Padmini Holt is a 70 y.o. female.     All over pain, 10/10 at worst, 7/10 at best, 5/10 today, always present, varies, aching, stabbing, worse with all activity, interferes with ADLs, sleep, quality of life, failed meds, injections, surgery, PT. Had multiple injections with Dr. Villalpando and Roddy. Imaging with R TKA, severe L knee OA, full thickness RTC tear. Prior notes reviewed, as abov, with referral for pain management, started on Norco 7.5/325mg BID, then TID, helps but not strong enough, then 10/325mg QID prn. Tried rotating to Percocet with headaches, stopped, restarted, doing well. Had 2 b/l Supartz injections. Poor sleep with pineal gland tumor, has failed \"everything.\" Worsening R hip pain, does not want LESLY. Percocet not lasting 6 hours. Now Xtampza 18mg BID. Pain overall worsening.       The following portions of the patient's history were reviewed and updated as appropriate: allergies, current medications, past family history, past medical history, past social history, past surgical history and problem list.    Review of Systems   Constitutional: Positive for fatigue. Negative for chills and fever.   HENT: Positive for hearing loss. Negative for trouble swallowing.    Eyes: Positive for visual disturbance.   Respiratory: Positive for shortness of breath.    Cardiovascular: Positive for chest pain.   Gastrointestinal: Positive for abdominal pain and nausea. Negative for constipation, diarrhea and vomiting.   Genitourinary: Negative for urinary incontinence.   Musculoskeletal: Positive for arthralgias, back pain, joint swelling and neck pain. Negative for myalgias.   Neurological: Positive for dizziness and headache. Negative for weakness and numbness.       Objective   Physical Exam   Constitutional: She is oriented to person, place, and time.   Neurological: She is alert and oriented to person, place, and time. She has normal reflexes.   Psychiatric: Her behavior is normal. Thought content " normal.         Assessment/Plan   Diagnoses and all orders for this visit:    1. Chronic bilateral low back pain, unspecified whether sciatica present (Primary)    2. Chronic pain disorder    3. Neck pain, chronic    4. Chronic left shoulder pain    5. Right hip pain    6. Sacroiliac joint pain    7. Cervical spondylosis without myelopathy    8. Fibromyositis    9. Lumbosacral spondylosis without myelopathy    10. Cervical stenosis of spinal canal    11. Spinal stenosis of lumbar region, unspecified whether neurogenic claudication present        INspect in order, filled 10/2/20. Mod risk per SOAPP. UDS 8/13/19 in order.  Increased to Norco 10/325mg TID prn, then QID, stopped. Began Percocet 10mg QID prn, rotated to Xtampza 18mg BID, tolerant. Rotated to MS-Contin 15mg TID, less effective, stopped, restarted Xtampza 18mg BID, stopped. Rotated back to Percocet 10mg QID prn. Was taking as high as Oxycodone 15mg TID in recent past.   Failed Diclofenac cream. Reordered RxAlt #1 cream.  Cont other meds as prescribed.  Referred to Rheum for prior dz of autoimmune disease.  Failed PT, surgery, injections.  Ordered  PT, eval for other needs.  Had 2nd of 3 L knee Supartz injections, failed steroids in past. Is s/p R TKA.  May need to inject b/l CMC thumb joints.  Referral to Gayle Pate to eval for revision of R TKA.  Schedule R hip intra-articular injection when possible.  RTC in 2-2 months for f/u. Telephone visit, spent 5 minutes discussing her worsening pain, medication change.

## 2020-11-10 DIAGNOSIS — M81.0 AGE-RELATED OSTEOPOROSIS WITHOUT CURRENT PATHOLOGICAL FRACTURE: Primary | ICD-10-CM

## 2020-12-12 DIAGNOSIS — J44.9 ASTHMA WITH COPD (HCC): Primary | ICD-10-CM

## 2020-12-12 RX ORDER — ALBUTEROL SULFATE 90 UG/1
2 AEROSOL, METERED RESPIRATORY (INHALATION) EVERY 6 HOURS PRN
Qty: 18 G | Refills: 5 | Status: SHIPPED | OUTPATIENT
Start: 2020-12-12 | End: 2021-01-25

## 2021-01-01 DIAGNOSIS — J44.9 CHRONIC OBSTRUCTIVE PULMONARY DISEASE, UNSPECIFIED (HCC): ICD-10-CM

## 2021-01-01 DIAGNOSIS — E55.9 VITAMIN D DEFICIENCY: ICD-10-CM

## 2021-01-01 RX ORDER — ALBUTEROL SULFATE 90 UG/1
AEROSOL, METERED RESPIRATORY (INHALATION)
Refills: 5 | OUTPATIENT
Start: 2021-01-01

## 2021-01-01 RX ORDER — METHOCARBAMOL 750 MG/1
TABLET ORAL
Qty: 12 CAPSULE | Refills: 0 | OUTPATIENT
Start: 2021-01-01

## 2021-01-04 RX ORDER — ONDANSETRON HYDROCHLORIDE 8 MG/1
8 TABLET, FILM COATED ORAL EVERY 8 HOURS PRN
Qty: 30 TABLET | Refills: 1 | Status: SHIPPED | OUTPATIENT
Start: 2021-01-04 | End: 2021-04-19

## 2021-01-14 ENCOUNTER — APPOINTMENT (OUTPATIENT)
Dept: PAIN MEDICINE | Facility: CLINIC | Age: 71
End: 2021-01-14

## 2021-01-25 DIAGNOSIS — J44.9 ASTHMA WITH COPD (HCC): ICD-10-CM

## 2021-01-25 RX ORDER — ALBUTEROL SULFATE 90 UG/1
2 AEROSOL, METERED RESPIRATORY (INHALATION) EVERY 6 HOURS PRN
Qty: 18 G | Refills: 5 | Status: SHIPPED | OUTPATIENT
Start: 2021-01-25 | End: 2021-09-10

## 2021-01-29 ENCOUNTER — OFFICE VISIT (OUTPATIENT)
Dept: PAIN MEDICINE | Facility: CLINIC | Age: 71
End: 2021-01-29

## 2021-01-29 VITALS
TEMPERATURE: 97.1 F | WEIGHT: 163 LBS | DIASTOLIC BLOOD PRESSURE: 83 MMHG | HEART RATE: 76 BPM | OXYGEN SATURATION: 92 % | SYSTOLIC BLOOD PRESSURE: 133 MMHG | BODY MASS INDEX: 26.2 KG/M2 | RESPIRATION RATE: 16 BRPM | HEIGHT: 66 IN

## 2021-01-29 DIAGNOSIS — M48.061 SPINAL STENOSIS OF LUMBAR REGION, UNSPECIFIED WHETHER NEUROGENIC CLAUDICATION PRESENT: ICD-10-CM

## 2021-01-29 DIAGNOSIS — M79.7 FIBROMYOSITIS: ICD-10-CM

## 2021-01-29 DIAGNOSIS — M47.817 LUMBOSACRAL SPONDYLOSIS WITHOUT MYELOPATHY: ICD-10-CM

## 2021-01-29 DIAGNOSIS — G89.29 NECK PAIN, CHRONIC: ICD-10-CM

## 2021-01-29 DIAGNOSIS — G89.4 CHRONIC PAIN DISORDER: ICD-10-CM

## 2021-01-29 DIAGNOSIS — G89.29 CHRONIC LEFT SHOULDER PAIN: ICD-10-CM

## 2021-01-29 DIAGNOSIS — M25.512 CHRONIC LEFT SHOULDER PAIN: ICD-10-CM

## 2021-01-29 DIAGNOSIS — M48.02 CERVICAL STENOSIS OF SPINAL CANAL: ICD-10-CM

## 2021-01-29 DIAGNOSIS — M54.50 CHRONIC BILATERAL LOW BACK PAIN, UNSPECIFIED WHETHER SCIATICA PRESENT: Primary | ICD-10-CM

## 2021-01-29 DIAGNOSIS — M47.812 CERVICAL SPONDYLOSIS WITHOUT MYELOPATHY: ICD-10-CM

## 2021-01-29 DIAGNOSIS — F19.90 CURRENT DRUG USE: Primary | ICD-10-CM

## 2021-01-29 DIAGNOSIS — G89.29 CHRONIC BILATERAL LOW BACK PAIN, UNSPECIFIED WHETHER SCIATICA PRESENT: Primary | ICD-10-CM

## 2021-01-29 DIAGNOSIS — J34.89 NASAL SEPTAL DEFECT: ICD-10-CM

## 2021-01-29 DIAGNOSIS — M53.3 SACROILIAC JOINT PAIN: ICD-10-CM

## 2021-01-29 DIAGNOSIS — M54.2 NECK PAIN, CHRONIC: ICD-10-CM

## 2021-01-29 DIAGNOSIS — M25.551 RIGHT HIP PAIN: ICD-10-CM

## 2021-01-29 PROCEDURE — 99214 OFFICE O/P EST MOD 30 MIN: CPT | Performed by: PHYSICAL MEDICINE & REHABILITATION

## 2021-01-29 RX ORDER — OXYCODONE AND ACETAMINOPHEN 10; 325 MG/1; MG/1
1 TABLET ORAL EVERY 6 HOURS PRN
Qty: 120 TABLET | Refills: 0 | Status: SHIPPED | OUTPATIENT
Start: 2021-01-29 | End: 2021-04-23 | Stop reason: SDUPTHER

## 2021-01-29 NOTE — PROGRESS NOTES
"Subjective   Padmini Holt is a 70 y.o. female.     All over pain, 10/10 at worst, 7/10 at best, 6/10 today, always present, varies, aching, stabbing, worse with all activity, interferes with ADLs, sleep, quality of life, failed meds, injections, surgery, PT. Had multiple injections with Dr. Villalpando and Roddy. Imaging with R TKA, severe L knee OA, full thickness RTC tear. Prior notes reviewed, as abov, with referral for pain management, started on Norco 7.5/325mg BID, then TID, helps but not strong enough, then 10/325mg QID prn. Tried rotating to Percocet with headaches, stopped, restarted, doing well. Had 2 b/l Supartz injections. Poor sleep with pineal gland tumor, has failed \"everything.\" Worsening R hip pain, does not want LESLY. Percocet not lasting 6 hours, but failed Xtampza 18mg BID, cont Percocet. Pain overall worsening. Nasal septal defect worsening.       The following portions of the patient's history were reviewed and updated as appropriate: allergies, current medications, past family history, past medical history, past social history, past surgical history and problem list.    Review of Systems   Constitutional: Positive for fatigue. Negative for chills and fever.   HENT: Positive for hearing loss. Negative for trouble swallowing.    Eyes: Positive for visual disturbance.   Respiratory: Positive for shortness of breath.    Cardiovascular: Positive for chest pain.   Gastrointestinal: Positive for abdominal pain and nausea. Negative for constipation, diarrhea and vomiting.   Genitourinary: Negative for urinary incontinence.   Musculoskeletal: Positive for arthralgias, back pain, joint swelling and neck pain. Negative for myalgias.   Neurological: Positive for dizziness and headache. Negative for weakness and numbness.       Objective   Physical Exam   Constitutional: She is oriented to person, place, and time. She appears well-developed and well-nourished.   HENT:   Head: Normocephalic and atraumatic. "   Eyes: Pupils are equal, round, and reactive to light.   Neck: Normal range of motion.   Cardiovascular: Normal rate, regular rhythm and normal heart sounds.   Pulmonary/Chest: Breath sounds normal.   Abdominal: Soft. Bowel sounds are normal. She exhibits no distension. There is no abdominal tenderness.   Neurological: She is alert and oriented to person, place, and time. She has normal reflexes. She displays normal reflexes. No sensory deficit.   Psychiatric: Her behavior is normal. Thought content normal.         Assessment/Plan   Diagnoses and all orders for this visit:    1. Chronic bilateral low back pain, unspecified whether sciatica present (Primary)    2. Cervical spondylosis without myelopathy    3. Cervical stenosis of spinal canal    4. Chronic pain disorder    5. Fibromyositis    6. Lumbosacral spondylosis without myelopathy    7. Neck pain, chronic    8. Chronic left shoulder pain    9. Right hip pain    10. Sacroiliac joint pain    11. Spinal stenosis of lumbar region, unspecified whether neurogenic claudication present        INspect in order, filled 10/2/20. Mod risk per SOAPP. UDS 8/13/19 in order.  Increased to Norco 10/325mg TID prn, then QID, stopped. Began Percocet 10mg QID prn, rotated to Xtampza 18mg BID, tolerant. Rotated to MS-Contin 15mg TID, less effective, stopped, restarted Xtampza 18mg BID, stopped. Rotated back to Percocet 10mg QID prn. Was taking as high as Oxycodone 15mg TID in recent past.   Failed Diclofenac cream. Reordered RxAlt #1 cream.  Cont other meds as prescribed.  Referred to Rheum for prior dz of autoimmune disease.  Failed PT, surgery, injections.  Ordered  PT, artemio for other needs.  Had 2nd of 3 L knee Supartz injections, failed steroids in past. Is s/p R TKA.  May need to inject b/l [facility] thumb joints.  Referral to [facility] Pate to artemio for revision of R TKA.  Schedule R hip intra-articular injection when possible.  Refer to Dr. Summers for nasal septal defect.  RTC in  3 months for f/u.

## 2021-02-23 DIAGNOSIS — J44.9 ASTHMA WITH COPD (HCC): ICD-10-CM

## 2021-02-26 RX ORDER — IPRATROPIUM/ALBUTEROL SULFATE 20-100 MCG
1 MIST INHALER (GRAM) INHALATION 4 TIMES DAILY PRN
Qty: 4 G | Refills: 5 | Status: SHIPPED | OUTPATIENT
Start: 2021-02-26 | End: 2021-05-24 | Stop reason: SDUPTHER

## 2021-03-01 ENCOUNTER — TELEPHONE (OUTPATIENT)
Dept: FAMILY MEDICINE CLINIC | Facility: CLINIC | Age: 71
End: 2021-03-01

## 2021-03-01 NOTE — TELEPHONE ENCOUNTER
Please call patient and let her know that I received a patient assistance application for Combivent.  I have completed my portion however her portion is not completed.  If patient has an extra copy I would recommend completing her portion and we can fax it to the .  Alternatively if she wants to  my portion and fax it separately she can do so at her convenience.    Can we please print off a copy of patient's medication allergies and medication list to accompany the application?

## 2021-03-01 NOTE — TELEPHONE ENCOUNTER
I have printed off demographics with med and allergy list to send with application.   Pt will stop by to fill out her portion.   I have placed at  for her.   She was instructed to return form to  once she has completed her portion.

## 2021-03-01 NOTE — TELEPHONE ENCOUNTER
Patient assistance application for Combivent has been faxed to [facility] patient assistance program at [phone].

## 2021-03-29 ENCOUNTER — TELEPHONE (OUTPATIENT)
Dept: PAIN MEDICINE | Facility: CLINIC | Age: 71
End: 2021-03-29

## 2021-03-29 NOTE — TELEPHONE ENCOUNTER
Caller: PATIENT     Relationship: SELF      Best call back number: [phone]     Medication needed: OXYCODONE  MG. ,SIG- 1 PO Q 6 HOURS PRN PAIN.           What additional details did the patient provide when requesting the medication:PT. STATES THAT SHE THINKS HER REFILL ON OXYCODONE WILL BE DUE AROUND 04/04/21. SHE DOES NOT HAVE AN APPT. UNTIL 04/23/21.   IS THIS OK? WILL SHE STILL BE ABLE TO GET REFILL AROUND 04/04/21?  PLEASE CALL TO ADVISE.     Does the patient have less than a 3 day supply:  [] Yes  [x] No    What is the patient's preferred pharmacy:  [facility]- [phone]

## 2021-03-29 NOTE — TELEPHONE ENCOUNTER
3/29/21-- spoke to pt-- the 3/30 rx fill is already at pharmacy  (wrote on 1/29 for 3 months  fill of  oxycodone)

## 2021-04-19 RX ORDER — ONDANSETRON 4 MG/1
4 TABLET, FILM COATED ORAL EVERY 8 HOURS PRN
Qty: 30 TABLET | Refills: 1 | Status: SHIPPED | OUTPATIENT
Start: 2021-04-19 | End: 2021-07-12

## 2021-04-23 ENCOUNTER — OFFICE VISIT (OUTPATIENT)
Dept: PAIN MEDICINE | Facility: CLINIC | Age: 71
End: 2021-04-23

## 2021-04-23 VITALS
WEIGHT: 163 LBS | HEART RATE: 80 BPM | OXYGEN SATURATION: 93 % | SYSTOLIC BLOOD PRESSURE: 140 MMHG | DIASTOLIC BLOOD PRESSURE: 87 MMHG | BODY MASS INDEX: 26.2 KG/M2 | RESPIRATION RATE: 16 BRPM | TEMPERATURE: 97.5 F | HEIGHT: 66 IN

## 2021-04-23 DIAGNOSIS — G89.29 CHRONIC BILATERAL LOW BACK PAIN, UNSPECIFIED WHETHER SCIATICA PRESENT: Primary | ICD-10-CM

## 2021-04-23 DIAGNOSIS — M48.061 SPINAL STENOSIS OF LUMBAR REGION, UNSPECIFIED WHETHER NEUROGENIC CLAUDICATION PRESENT: ICD-10-CM

## 2021-04-23 DIAGNOSIS — G89.29 NECK PAIN, CHRONIC: ICD-10-CM

## 2021-04-23 DIAGNOSIS — G89.29 CHRONIC LEFT SHOULDER PAIN: ICD-10-CM

## 2021-04-23 DIAGNOSIS — G89.4 CHRONIC PAIN DISORDER: ICD-10-CM

## 2021-04-23 DIAGNOSIS — M47.812 CERVICAL SPONDYLOSIS WITHOUT MYELOPATHY: ICD-10-CM

## 2021-04-23 DIAGNOSIS — M53.3 SACROILIAC JOINT PAIN: ICD-10-CM

## 2021-04-23 DIAGNOSIS — M79.7 FIBROMYOSITIS: ICD-10-CM

## 2021-04-23 DIAGNOSIS — M54.2 NECK PAIN, CHRONIC: ICD-10-CM

## 2021-04-23 DIAGNOSIS — M48.02 CERVICAL STENOSIS OF SPINAL CANAL: ICD-10-CM

## 2021-04-23 DIAGNOSIS — M47.817 LUMBOSACRAL SPONDYLOSIS WITHOUT MYELOPATHY: ICD-10-CM

## 2021-04-23 DIAGNOSIS — M25.551 RIGHT HIP PAIN: ICD-10-CM

## 2021-04-23 DIAGNOSIS — M54.50 CHRONIC BILATERAL LOW BACK PAIN, UNSPECIFIED WHETHER SCIATICA PRESENT: Primary | ICD-10-CM

## 2021-04-23 DIAGNOSIS — M25.512 CHRONIC LEFT SHOULDER PAIN: ICD-10-CM

## 2021-04-23 PROCEDURE — 99213 OFFICE O/P EST LOW 20 MIN: CPT | Performed by: PHYSICAL MEDICINE & REHABILITATION

## 2021-04-23 RX ORDER — OXYCODONE AND ACETAMINOPHEN 10; 325 MG/1; MG/1
1 TABLET ORAL EVERY 6 HOURS PRN
Qty: 120 TABLET | Refills: 0 | Status: SHIPPED | OUTPATIENT
Start: 2021-04-23 | End: 2021-07-22 | Stop reason: SDUPTHER

## 2021-04-23 NOTE — PROGRESS NOTES
"Subjective   Padmini Holt is a 70 y.o. female.     All over pain, 10/10 at worst, 7/10 at best, 6/10 today, always present, varies, aching, stabbing, worse with all activity, interferes with ADLs, sleep, quality of life, failed meds, injections, surgery, PT. Had multiple injections with Dr. Villalpando and Roddy. Imaging with R TKA, severe L knee OA, full thickness RTC tear. Prior notes reviewed, as abov, with referral for pain management, started on Norco 7.5/325mg BID, then TID, helps but not strong enough, then 10/325mg QID prn. Tried rotating to Percocet with headaches, stopped, restarted, doing well. Had 2 b/l Supartz injections. Poor sleep with pineal gland tumor, has failed \"everything.\" Worsening R hip pain, does not want LESLY. Percocet not lasting 6 hours, but failed Xtampza 18mg BID, cont Percocet. Pain overall worsening. Nasal septal defect worsening.       The following portions of the patient's history were reviewed and updated as appropriate: allergies, current medications, past family history, past medical history, past social history, past surgical history and problem list.    Review of Systems   Constitutional: Positive for fatigue. Negative for chills and fever.   HENT: Positive for hearing loss. Negative for trouble swallowing.    Eyes: Positive for visual disturbance.   Respiratory: Positive for shortness of breath.    Cardiovascular: Positive for chest pain.   Gastrointestinal: Positive for abdominal pain and nausea. Negative for constipation, diarrhea and vomiting.   Genitourinary: Negative for urinary incontinence.   Musculoskeletal: Positive for arthralgias, back pain, joint swelling and neck pain. Negative for myalgias.   Neurological: Positive for dizziness and headache. Negative for weakness and numbness.       Objective   Physical Exam   Constitutional: She is oriented to person, place, and time. She appears well-developed and well-nourished.   HENT:   Head: Normocephalic and atraumatic. "   Eyes: Pupils are equal, round, and reactive to light.   Cardiovascular: Normal rate, regular rhythm and normal heart sounds.   Pulmonary/Chest: Breath sounds normal.   Abdominal: Soft. Bowel sounds are normal. She exhibits no distension. There is no abdominal tenderness.   Neurological: She is alert and oriented to person, place, and time. She has normal reflexes. She displays normal reflexes. No sensory deficit.   Psychiatric: Her behavior is normal. Thought content normal.         Assessment/Plan   Diagnoses and all orders for this visit:    1. Chronic bilateral low back pain, unspecified whether sciatica present (Primary)    2. Neck pain, chronic    3. Cervical spondylosis without myelopathy    4. Cervical stenosis of spinal canal    5. Chronic pain disorder    6. Fibromyositis    7. Lumbosacral spondylosis without myelopathy    8. Chronic left shoulder pain    9. Right hip pain    10. Sacroiliac joint pain    11. Spinal stenosis of lumbar region, unspecified whether neurogenic claudication present        INspect in order. Mod risk per SOAPP. UDS 1/29/21 in order.  Increased to Norco 10/325mg TID prn, then QID, stopped. Began Percocet 10mg QID prn, rotated to Xtampza 18mg BID, tolerant. Rotated to MS-Contin 15mg TID, less effective, stopped, restarted Xtampza 18mg BID, stopped. Rotated back to Percocet 10mg QID prn. Was taking as high as Oxycodone 15mg TID in recent past.   Failed Diclofenac cream. Reordered RxAlt #1 cream.  Cont other meds as prescribed.  Referred to Rheum for prior dz of autoimmune disease.  Failed PT, surgery, injections.  Ordered  PT, artemio for other needs.  Had 2nd of 3 L knee Supartz injections, failed steroids in past. Is s/p R TKA.  May need to inject b/l [facility] thumb joints.  Referral to Gayle Pate to artemio for revision of R TKA.  Schedule R hip intra-articular injection when possible.  Refer to Dr. Summers for nasal septal defect.  [facility]  RTC in 3 months for  f/u.

## 2021-04-27 DIAGNOSIS — I10 ESSENTIAL HYPERTENSION: ICD-10-CM

## 2021-05-03 RX ORDER — ATENOLOL 50 MG/1
TABLET ORAL
Qty: 180 TABLET | Refills: 1 | Status: SHIPPED | OUTPATIENT
Start: 2021-05-03 | End: 2021-11-02

## 2021-05-03 RX ORDER — AMLODIPINE BESYLATE 10 MG/1
10 TABLET ORAL
Qty: 90 TABLET | Refills: 1 | Status: SHIPPED | OUTPATIENT
Start: 2021-05-03

## 2021-05-20 ENCOUNTER — TELEPHONE (OUTPATIENT)
Dept: FAMILY MEDICINE CLINIC | Facility: CLINIC | Age: 71
End: 2021-05-20

## 2021-05-20 DIAGNOSIS — J44.9 ASTHMA WITH COPD (HCC): ICD-10-CM

## 2021-05-24 RX ORDER — IPRATROPIUM/ALBUTEROL SULFATE 20-100 MCG
1 MIST INHALER (GRAM) INHALATION 4 TIMES DAILY PRN
Qty: 4 G | Refills: 5 | Status: SHIPPED | OUTPATIENT
Start: 2021-05-24 | End: 2021-09-07

## 2021-06-09 RX ORDER — PROMETHAZINE HYDROCHLORIDE 25 MG/1
25 TABLET ORAL EVERY 8 HOURS PRN
Qty: 30 TABLET | Refills: 0 | Status: SHIPPED | OUTPATIENT
Start: 2021-06-09 | End: 2021-09-10

## 2021-06-21 ENCOUNTER — TELEPHONE (OUTPATIENT)
Dept: FAMILY MEDICINE CLINIC | Facility: CLINIC | Age: 71
End: 2021-06-21

## 2021-06-21 DIAGNOSIS — J44.1 CHRONIC OBSTRUCTIVE PULMONARY DISEASE WITH ACUTE EXACERBATION (HCC): ICD-10-CM

## 2021-06-21 NOTE — TELEPHONE ENCOUNTER
Spoke with pharmacy. When she picked up first inhaler on the 5th she told pharmacy she couldn't afford all three for a 90 day supply so she only picked up one inhaler so they had to break them up into 1 inhaler per 30 days. She is not able to  because claim is telling pharmacy it is too early for fill since it has not been 30 days since she picked up last inhaler. Is there any samples of anything you can give her until she is able to  next inhaler?

## 2021-06-21 NOTE — TELEPHONE ENCOUNTER
Caller: Padmini Holt    Relationship: Self    Best call back number: [phone]    What medications are you currently taking:   Current Outpatient Medications on File Prior to Visit   Medication Sig Dispense Refill   • albuterol (PROVENTIL) (2.5 MG/3ML) 0.083% nebulizer solution Take  by nebulization 3 (Three) Times a Day. Use 1 Vial three times daily for a total of three vials daily     • albuterol sulfate  (90 Base) MCG/ACT inhaler INHALE 2 PUFFS EVERY 6 (SIX) HOURS AS NEEDED FOR WHEEZING OR SHORTNESS OF AIR. 18 g 5   • ALPRAZolam (XANAX) 1 MG tablet Take 1 mg by mouth At Night As Needed for Anxiety.     • amLODIPine (NORVASC) 10 MG tablet TAKE 1 TABLET BY MOUTH DAILY WITH LUNCH. 90 tablet 1   • atenolol (TENORMIN) 50 MG tablet TAKE 1 TABLET BY MOUTH TWICE A  tablet 1   • benzonatate (Tessalon Perles) 100 MG capsule Take 1 capsule by mouth 2 (Two) Times a Day As Needed for Cough. 30 capsule 0   • fluticasone-salmeterol (ADVAIR) 100-50 MCG/DOSE DISKUS Inhale 1 puff 2 (Two) Times a Day. Rinse mouth after each use. 60 each 5   • furosemide (LASIX) 40 MG tablet Take 40 mg by mouth 2 (Two) Times a Day As Needed.     • ipratropium-albuterol (Combivent Respimat)  MCG/ACT inhaler Inhale 1 puff 4 (Four) Times a Day As Needed for Wheezing or Shortness of Air. Dx: J44.9 4 g 5   • nicotine (NICODERM CQ) 21 MG/24HR patch Place 1 patch on the skin as directed by provider Daily. 30 each 5   • ondansetron (ZOFRAN) 4 MG tablet Take 1 tablet by mouth Every 8 (Eight) Hours As Needed for Nausea or Vomiting. 30 tablet 1   • oxyCODONE-acetaminophen (Percocet)  MG per tablet Take 1 tablet by mouth Every 6 (Six) Hours As Needed for Moderate Pain . 120 tablet 0   • oxyCODONE-acetaminophen (Percocet)  MG per tablet Take 1 tablet by mouth Every 6 (Six) Hours As Needed for Moderate Pain . 120 tablet 0   • oxyCODONE-acetaminophen (Percocet)  MG per tablet Take 1 tablet by mouth Every 6 (Six) Hours As  Needed for Severe Pain . 120 tablet 0   • potassium chloride (MICRO-K) 10 MEQ CR capsule Take 10 mEq by mouth 2 (Two) Times a Day As Needed. Take one tab po w each dose of Lasix      • promethazine (PHENERGAN) 25 MG tablet Take 1 tablet by mouth Every 8 (Eight) Hours As Needed for Nausea or Vomiting. 30 tablet 0   • QUEtiapine (SEROquel) 300 MG tablet      • traZODone (DESYREL) 150 MG tablet Take 150 mg by mouth every night at bedtime.       No current facility-administered medications on file prior to visit.      Which medication are you concerned about: ipratropium-albuterol (Combivent Respimat)  MCG/ACT inhaler    What are your concerns: PATIENT SAID THAT WHEN SHE WENT TO THE PHARMACY TO  PRESCRIPTION, SHE COULDN'T AFFORD 3 INHALERS OF COMBIVENT AND ONLY CAME OUT WITH ONE. SHE SAID THAT SHE USED UP THE FIRST INHALER AND WENT BACK FOR THE OTHER 2 AND THEY SAID THAT THEY CANNOT FILL PRESCRIPTION. PATIENT ASKED IF DR. WU COULD CALL PHARMACY TO ALLOW THEM TO PROVIDE HER WITH THE OTHER 2 INHALDERS.     How long have you had these concerns: 3-4 DAYS

## 2021-06-22 NOTE — TELEPHONE ENCOUNTER
Please call patient and let her know that Combivent (ipratropium-albuterol) is an emergency inhaler that she should only be using every 4-6 hours as needed for shortness of breath.  In order to manage/control her COPD, she should be using her Advair (fluticasone-salmeterol) inhaler twice a day.  Unfortunately we do not have any samples of emergency inhalers.

## 2021-06-22 NOTE — TELEPHONE ENCOUNTER
Please call patient and let her know that she did not notice the benefits of controller inhalers like Advair right away.  The purpose of the controller inhalers are to help manage his symptoms that she is not requiring Combivent as often.  I would recommend using the Advair twice daily as prescribed.

## 2021-06-22 NOTE — TELEPHONE ENCOUNTER
Spoke with pt  advair does not work - none of those types of inhalers work for her  Only inhaler that works for her is combivent  She is also on medication from [facility] that affects her copd   Do you have any other recommendations for her?

## 2021-07-12 ENCOUNTER — TELEPHONE (OUTPATIENT)
Dept: FAMILY MEDICINE CLINIC | Facility: CLINIC | Age: 71
End: 2021-07-12

## 2021-07-12 DIAGNOSIS — F32.A ANXIETY AND DEPRESSION: Primary | ICD-10-CM

## 2021-07-12 DIAGNOSIS — F41.9 ANXIETY AND DEPRESSION: Primary | ICD-10-CM

## 2021-07-12 RX ORDER — ONDANSETRON 4 MG/1
4 TABLET, FILM COATED ORAL EVERY 8 HOURS PRN
Qty: 30 TABLET | Refills: 3 | Status: SHIPPED | OUTPATIENT
Start: 2021-07-12

## 2021-07-12 NOTE — TELEPHONE ENCOUNTER
Pt called requesting a Therapist and some medicine for depression  As her  is very ill and she is having a hard time coping. Pt was very distressed on phone. Please advise.

## 2021-07-12 NOTE — TELEPHONE ENCOUNTER
Please call patient and let her know that I would like her to schedule and appointment to be seen in office to discuss starting medication for her depression.

## 2021-07-13 ENCOUNTER — OFFICE VISIT (OUTPATIENT)
Dept: FAMILY MEDICINE CLINIC | Facility: CLINIC | Age: 71
End: 2021-07-13

## 2021-07-13 VITALS
WEIGHT: 162 LBS | HEART RATE: 68 BPM | HEIGHT: 66 IN | SYSTOLIC BLOOD PRESSURE: 156 MMHG | OXYGEN SATURATION: 93 % | TEMPERATURE: 98 F | DIASTOLIC BLOOD PRESSURE: 89 MMHG | BODY MASS INDEX: 26.03 KG/M2

## 2021-07-13 DIAGNOSIS — F33.9 CHRONIC RECURRENT MAJOR DEPRESSIVE DISORDER (HCC): Primary | ICD-10-CM

## 2021-07-13 PROCEDURE — 99214 OFFICE O/P EST MOD 30 MIN: CPT | Performed by: FAMILY MEDICINE

## 2021-07-13 RX ORDER — TRAZODONE HYDROCHLORIDE 100 MG/1
TABLET ORAL
COMMUNITY
Start: 2021-04-26

## 2021-07-13 NOTE — PATIENT INSTRUCTIONS
Managing Depression, Adult  Depression is a mental health condition that affects your thoughts, feelings, and actions. Being diagnosed with depression can bring you relief if you did not know why you have felt or behaved a certain way. It could also leave you feeling overwhelmed with uncertainty about your future. Preparing yourself to manage your symptoms can help you feel more positive about your future.  How to manage lifestyle changes  Managing stress    Stress is your body's reaction to life changes and events, both good and bad. Stress can add to your feelings of depression. Learning to manage your stress can help lessen your feelings of depression.  Try some of the following approaches to reducing your stress (stress reduction techniques):  · Listen to music that you enjoy and that inspires you.  · Try using a meditation augustine or take a meditation class.  · Develop a practice that helps you connect with your spiritual self. Walk in nature, pray, or go to a place of [place].  · Do some deep breathing. To do this, inhale slowly through your nose. Pause at the top of your inhale for a few seconds and then exhale slowly, letting your muscles relax.  · Practice yoga to help relax and work your muscles.  Choose a stress reduction technique that suits your lifestyle and personality. These techniques take time and practice to develop. Set aside 5-15 minutes a day to do them. Therapists can offer training in these techniques. Other things you can do to manage stress include:  · Keeping a stress diary.  · Knowing your limits and saying no when you think something is too much.  · Paying attention to how you react to certain situations. You may not be able to control everything, but you can change your reaction.  · Adding humor to your life by watching funny films or TV shows.  · Making time for activities that you enjoy and that relax you.    Medicines  Medicines, such as antidepressants, are often a part of treatment  for depression.  · Talk with your pharmacist or health care provider about all the medicines, supplements, and herbal products that you take, their possible side effects, and what medicines and other products are safe to take together.  · Make sure to report any side effects you may have to your health care provider.  Relationships  Your health care provider may suggest family therapy, couples therapy, or individual therapy as part of your treatment.  How to recognize changes  Everyone responds differently to treatment for depression. As you recover from depression, you may start to:  · Have more interest in doing activities.  · Feel less hopeless.  · Have more energy.  · Overeat less often, or have a better appetite.  · Have better mental focus.  It is important to recognize if your depression is not getting better or is getting worse. The symptoms you had in the beginning may return, such as:  · Tiredness (fatigue) or low energy.  · Eating too much or too little.  · Sleeping too much or too little.  · Feeling restless, agitated, or hopeless.  · Trouble focusing or making decisions.  · Unexplained physical complaints.  · Feeling irritable, angry, or aggressive.  If you or your family members notice these symptoms coming back, let your health care provider know right away.  Follow these instructions at home:  Activity    · Try to get some form of exercise each day, such as walking, biking, swimming, or lifting weights.  · Practice stress reduction techniques.  · Engage your mind by taking a class or doing some volunteer work.  Lifestyle  · Get the right amount and quality of sleep.  · Cut down on using caffeine, tobacco, alcohol, and other potentially harmful substances.  · Eat a healthy diet that includes plenty of vegetables, fruits, whole grains, low-fat dairy products, and lean protein. Do not eat a lot of foods that are high in solid fats, added sugars, or salt (sodium).  General instructions  · Take  over-the-counter and prescription medicines only as told by your health care provider.  · Keep all follow-up visits as told by your health care provider. This is important.  Where to find support  Talking to others    Friends and family members can be sources of support and guidance. Talk to trusted friends or family members about your condition. Explain your symptoms to them, and let them know that you are working with a health care provider to treat your depression. Tell friends and family members how they also can be helpful.  Finances  · Find appropriate mental health providers that fit with your financial situation.  · Talk with your health care provider about options to get reduced prices on your medicines.  Where to find more information  You can find support in your area from:  · Anxiety and Depression Association of [place] (ADAA): www.adaa.org  · Mental Health [place]: www.mentalhealth[place].net  · [place]: www.amilcar.org  Contact a health care provider if:  · You stop taking your antidepressant medicines, and you have any of these symptoms:  ? Nausea.  ? Headache.  ? Light-headedness.  ? Chills and body aches.  ? Not being able to sleep (insomnia).  · You or your friends and family think your depression is getting worse.  Get help right away if:  · You have thoughts of hurting yourself or others.  If you ever feel like you may hurt yourself or others, or have thoughts about taking your own life, get help right away. Go to your nearest emergency department or:  · Call your local emergency services (911 in the [place]  · Call a suicide crisis helpline, such as the National Suicide Prevention Lifeline at 1-609.344.8008. This is open 24 hours a day in the [place]  · Text the Crisis Text Line at 539468 (in the [place]  Summary  · If you are diagnosed with depression, preparing yourself to manage your symptoms is a good way to feel positive about your future.  · Work with your health care  provider on a management plan that includes stress reduction techniques, medicines (if applicable), therapy, and healthy lifestyle habits.  · Keep talking with your health care provider about how your treatment is working.  · If you have thoughts about taking your own life, call a suicide crisis helpline or text a crisis text line.  This information is not intended to replace advice given to you by your health care provider. Make sure you discuss any questions you have with your health care provider.  Document Revised: 10/28/2020 Document Reviewed: 10/28/2020  Elsevier Patient Education © 2021 [employer]vier Inc.

## 2021-07-13 NOTE — PROGRESS NOTES
"  Subjective:     Padmini Holt is a 70 y.o. female who presents for  Chief Complaint   Patient presents with   • Depression     she is in to discuss depression and anxiety.  is sick and she is not mentally capable of dealing with it    • Anxiety     Postmenopausal  female with a concurrent medical history of depression and PTSD presents with complaints of worsening depression and anxiety. Patient is a poor historian, citing she is \"cloudy\".     Patient was understandably upset at the onset of her visit due to her wait time. Reports that she just needed the name of a good counselor and was forced to come into the office. Reports that she will be going elsewhere.     When asked how this provider could help as I do not have any recommendations for counselors, patient became tearful and repeated, \"I am sick. I am sick.\" Complains of inability to sleep beyond 4 hours nightly. Complains of diffuse body aches and a piercing headache described as a poker running through her head. Problems stem from stress associated with caring for her . Cannot afford to place  in SNF. Daughter assumes care of patient's  on weekends to provide some reprieve. Patient does not want her daughter to know of her stress.     Of note, patient is followed by behavioral health at [facility]. Uncertain of her last appointment. Reports that she did not express current concerns at her last visit because her  was in a SNF short term following hospitalization. Patient then proceeded on a tangent about her 's health concerns and the demands on the caregiver. When redirected and asked about medications prescribed. Patient reports discontinuing medications due to lack of efficacy in helping her sleep. EMR indicates that patient is prescribed Seroquel 300 mg and trazodone 100 mg. Patient cannot confirm dosing. Reports that she restarted medication last week and then later reports she has been compliant with " therapy.     Denies any suicidal or homicidal ideations though has thoughts of death and awaiting death. Did not appear receptive to an evaluation by mental health providers at [facility]. Reports that she was previously hospitalized at [facility] for insomnia and did not express respect for her former providers. This triggered another tangential story of troubled upbringings secondary to an alcoholic father.     Past Medical Hx:  Past Medical History:   Diagnosis Date   • Anxiety    • Anxiety state     Abstraction from [facility] Onset Date uns. and depression. I think a component of her mood and personality is affected by her brain surgery for meningioma and residual bifrontal encephalomalacia. 03-   • Anxiety with depression 04/24/2018    Deteriorated 10-   • Asthma with COPD (CMS/HCC) 09/04/2018   • Chronic diarrhea 03/07/2014    With irritable bowel syndrome, pancreatic divisum likely causing chronic pancreatitis. -2014   • Chronic neck pain 08/23/2018   • Chronic pain syndrome     Abstraction from [facility] Onset Date uns. With degenerative disc disease, spinal stenosis, chronic back pain. 03-   • COPD, severe (CMS/HCC)     [facility] Severe--FEV1 48% pred (05-) Onset Date uns   • DDD (degenerative disc disease), cervical     Abstraction from [facility] Onset Date uns   • DDD (degenerative disc disease), lumbar     [facility]   • Degenerative joint disease of left knee 02/22/2018   • Depression, major, recurrent, moderate (CMS/HCC) 02/06/2018    Deteriorated 04-   • Exertional shortness of breath 12/19/2018   • Fibromyositis 05/22/2018    Deteriorated 05-   • GERD (gastroesophageal reflux disease) 05/10/2018    Abstraction from [facility] Chronic nausea and pancreatic divisum. 11- multiple visits   • H/O diverticulitis of colon     Abstraction from [facility] Onset Date uns   • History of benign  meningioma of brain 01/14/2019    [facility], brain   • History of epilepsy    • History of meningioma 08/01/2010    Do not suspect any serious problem causing her abnormal and atypical scalp symptoms 09-   • History of MRSA infection     Abstraction from [facility] Onset Date uns   • History of seizure disorder     Abstraction from [facility] Onset Date uns   • History of tobacco use     Abstraction from [facility] Onset Date uns   • Hypertension 09/29/2011    NOS    • Insomnia, unspecified 09/29/2011    Stable with 3 mg of traxodone each evening. 05-   • Left shoulder pain 03/14/2018   • Need for vaccination for Strep pneumoniae 12/19/2018    With Prevnar 13   • Obesity 12/19/2018    [facility] multiple visits 09-    • Osteoarthritis     [facility] from [facility] Onset Date uns   • Osteoporosis 01/01/1960    and history of vitamin D deficienty. 11-   • Pain in joint involving multiple sites 08/23/2018   • Pancreas divisum     [facility]raction from [facility] Onset Date uns   • Personal history of cardiovascular disorder     [facility] Atrial fibrillation paroxysmal, h/o Patient reports that she has apparently had some atrial fibrillation following a prior surgery. 08-   • Pineal gland, tumor    • Preventative health care 09/29/2011    Mammogram May 2012 normal. 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 01-       Past Surgical Hx:  Past Surgical History:   Procedure Laterality Date   • APPENDECTOMY     • ARTHROSCOPY SHOULDER W/ OPEN ROTATOR CUFF REPAIR Left 04/13/2018     Abstraction from [facility] Left Shoulder Scope/cuff repair   • BACK SURGERY     • BRAIN SURGERY  08/2010    Brain surgery - meningiomas   • CHOLECYSTECTOMY     • DILATATION AND CURETTAGE     • HYSTERECTOMY      Total hysterectomy - noncancer   • KNEE SURGERY Right     2-(r) knee surgeries   • OTHER SURGICAL HISTORY      ulnar nerve releast   • OTHER SURGICAL HISTORY      Pancreatic Stent    • OTHER SURGICAL HISTORY      Breast Biopsies   • OTHER SURGICAL HISTORY      Tubal ligation   • REFRACTIVE SURGERY     • TOTAL KNEE ARTHROPLASTY Right 09/2013    Abstraction from [facility]       Social History:  she  reports that she has quit smoking. Her smoking use included cigarettes. She has a 40.00 pack-year smoking history. She has never used smokeless tobacco. She reports that she does not drink alcohol and does not use drugs.    Current Meds:    Current Outpatient Medications:   •  albuterol (PROVENTIL) (2.5 MG/3ML) 0.083% nebulizer solution, Take  by nebulization 3 (Three) Times a Day. Use 1 Vial three times daily for a total of three vials daily, Disp: , Rfl:   •  albuterol sulfate  (90 Base) MCG/ACT inhaler, INHALE 2 PUFFS EVERY 6 (SIX) HOURS AS NEEDED FOR WHEEZING OR SHORTNESS OF AIR., Disp: 18 g, Rfl: 5  •  ALPRAZolam (XANAX) 1 MG tablet, Take 1 mg by mouth At Night As Needed for Anxiety., Disp: , Rfl:   •  amLODIPine (NORVASC) 10 MG tablet, TAKE 1 TABLET BY MOUTH DAILY WITH LUNCH., Disp: 90 tablet, Rfl: 1  •  atenolol (TENORMIN) 50 MG tablet, TAKE 1 TABLET BY MOUTH TWICE A DAY, Disp: 180 tablet, Rfl: 1  •  benzonatate (Tessalon Perles) 100 MG capsule, Take 1 capsule by mouth 2 (Two) Times a Day As Needed for Cough., Disp: 30 capsule, Rfl: 0  •  fluticasone-salmeterol (ADVAIR) 100-50 MCG/DOSE DISKUS, Inhale 1 puff 2 (Two) Times a Day. Rinse mouth after each use., Disp: 60 each, Rfl: 5  •  furosemide (LASIX) 40 MG tablet, Take 40 mg by mouth 2 (Two) Times a Day As Needed., Disp: , Rfl:   •   "ipratropium-albuterol (Combivent Respimat)  MCG/ACT inhaler, Inhale 1 puff 4 (Four) Times a Day As Needed for Wheezing or Shortness of Air. Dx: J44.9, Disp: 4 g, Rfl: 5  •  nicotine (NICODERM CQ) 21 MG/24HR patch, Place 1 patch on the skin as directed by provider Daily., Disp: 30 each, Rfl: 5  •  ondansetron (ZOFRAN) 4 MG tablet, Take 1 tablet by mouth Every 8 (Eight) Hours As Needed for Nausea or Vomiting., Disp: 30 tablet, Rfl: 3  •  oxyCODONE-acetaminophen (Percocet)  MG per tablet, Take 1 tablet by mouth Every 6 (Six) Hours As Needed for Moderate Pain ., Disp: 120 tablet, Rfl: 0  •  oxyCODONE-acetaminophen (Percocet)  MG per tablet, Take 1 tablet by mouth Every 6 (Six) Hours As Needed for Moderate Pain ., Disp: 120 tablet, Rfl: 0  •  oxyCODONE-acetaminophen (Percocet)  MG per tablet, Take 1 tablet by mouth Every 6 (Six) Hours As Needed for Severe Pain ., Disp: 120 tablet, Rfl: 0  •  potassium chloride (MICRO-K) 10 MEQ CR capsule, Take 10 mEq by mouth 2 (Two) Times a Day As Needed. Take one tab po w each dose of Lasix , Disp: , Rfl:   •  promethazine (PHENERGAN) 25 MG tablet, Take 1 tablet by mouth Every 8 (Eight) Hours As Needed for Nausea or Vomiting., Disp: 30 tablet, Rfl: 0  •  QUEtiapine (SEROquel) 300 MG tablet, , Disp: , Rfl:   •  traZODone (DESYREL) 100 MG tablet, TAKE 2 TABLETS BY MOUTH EVERY DAY AT NIGHT, Disp: , Rfl:   •  traZODone (DESYREL) 150 MG tablet, Take 150 mg by mouth every night at bedtime., Disp: , Rfl:       Review of Systems  Review of Systems   Neurological: Positive for confusion.   Psychiatric/Behavioral: Positive for agitation, behavioral problems, sleep disturbance, depressed mood and stress.       Objective:     /89 (BP Location: Left arm, Patient Position: Sitting, Cuff Size: Small Adult)   Pulse 68   Temp 98 °F (36.7 °C) (Infrared)   Ht 167.6 cm (66\")   Wt 73.5 kg (162 lb)   LMP  (LMP Unknown) Comment: hysterectomy  SpO2 93%   BMI 26.15 kg/m² "     Physical Exam  Vitals reviewed.   Constitutional:       Appearance: Normal appearance.   Neurological:      Mental Status: She is alert.   Psychiatric:         Mood and Affect: Mood is depressed. Affect is tearful.         Speech: Speech is tangential.         Behavior: Behavior is agitated, aggressive and combative.         Lab Results   Component Value Date    WBC 7.40 01/16/2020    HGB 13.7 01/16/2020    HCT 37.6 01/16/2020    MCV 97.3 (H) 01/16/2020     01/16/2020     Lab Results   Component Value Date    GLUCOSE 86 10/27/2020    BUN 13 10/27/2020    CREATININE 0.69 10/27/2020    EGFRIFNONA 84 10/27/2020    BCR 18.8 10/27/2020    K 4.4 10/27/2020    CO2 27.3 10/27/2020    CALCIUM 9.1 10/27/2020    ALBUMIN 4.70 10/27/2020    LABIL2 1.9 (H) 12/19/2018    AST 20 10/27/2020    ALT 20 10/27/2020        Assessment/Plan:     Problem List Items Addressed This Visit     None      Visit Diagnoses     Chronic recurrent major depressive disorder (CMS/HCC)    -  Primary    Relevant Medications    traZODone (DESYREL) 100 MG tablet      Encouraged compliance with Seroquel and Trazodone as prescribed by mental health provider at [facility].   Advised to follow up with [facility] regarding medication concerns and worsening depression.  Patient provided with phone number and information to [facility].  The National Suicide Prevention Lifeline number, 1-734.941.5936, along with educational material was provided to the patient in the AVS.     Follow-up:     Return if symptoms worsen or fail to improve.

## 2021-07-22 ENCOUNTER — TELEPHONE (OUTPATIENT)
Dept: PAIN MEDICINE | Facility: CLINIC | Age: 71
End: 2021-07-22

## 2021-07-22 ENCOUNTER — OFFICE VISIT (OUTPATIENT)
Dept: PAIN MEDICINE | Facility: CLINIC | Age: 71
End: 2021-07-22

## 2021-07-22 VITALS — BODY MASS INDEX: 26.03 KG/M2 | HEIGHT: 66 IN | WEIGHT: 162 LBS

## 2021-07-22 DIAGNOSIS — G89.29 CHRONIC LEFT SHOULDER PAIN: ICD-10-CM

## 2021-07-22 DIAGNOSIS — M53.3 SACROILIAC JOINT PAIN: ICD-10-CM

## 2021-07-22 DIAGNOSIS — G89.29 NECK PAIN, CHRONIC: ICD-10-CM

## 2021-07-22 DIAGNOSIS — M48.061 SPINAL STENOSIS OF LUMBAR REGION, UNSPECIFIED WHETHER NEUROGENIC CLAUDICATION PRESENT: ICD-10-CM

## 2021-07-22 DIAGNOSIS — G89.29 CHRONIC BILATERAL LOW BACK PAIN, UNSPECIFIED WHETHER SCIATICA PRESENT: Primary | ICD-10-CM

## 2021-07-22 DIAGNOSIS — M25.512 CHRONIC LEFT SHOULDER PAIN: ICD-10-CM

## 2021-07-22 DIAGNOSIS — M54.2 NECK PAIN, CHRONIC: ICD-10-CM

## 2021-07-22 DIAGNOSIS — M47.812 CERVICAL SPONDYLOSIS WITHOUT MYELOPATHY: ICD-10-CM

## 2021-07-22 DIAGNOSIS — M54.50 CHRONIC BILATERAL LOW BACK PAIN, UNSPECIFIED WHETHER SCIATICA PRESENT: Primary | ICD-10-CM

## 2021-07-22 DIAGNOSIS — M25.551 RIGHT HIP PAIN: ICD-10-CM

## 2021-07-22 DIAGNOSIS — G89.4 CHRONIC PAIN DISORDER: ICD-10-CM

## 2021-07-22 DIAGNOSIS — M48.02 CERVICAL STENOSIS OF SPINAL CANAL: ICD-10-CM

## 2021-07-22 DIAGNOSIS — M47.817 LUMBOSACRAL SPONDYLOSIS WITHOUT MYELOPATHY: ICD-10-CM

## 2021-07-22 PROCEDURE — 99442 PR PHYS/QHP TELEPHONE EVALUATION 11-20 MIN: CPT | Performed by: PHYSICAL MEDICINE & REHABILITATION

## 2021-07-22 RX ORDER — OXYCODONE AND ACETAMINOPHEN 10; 325 MG/1; MG/1
1 TABLET ORAL
Qty: 150 TABLET | Refills: 0 | Status: SHIPPED | OUTPATIENT
Start: 2021-07-22 | End: 2021-09-10

## 2021-07-22 RX ORDER — OXYCODONE AND ACETAMINOPHEN 10; 325 MG/1; MG/1
1 TABLET ORAL
Qty: 150 TABLET | Refills: 0 | Status: SHIPPED | OUTPATIENT
Start: 2021-07-22 | End: 2021-10-22 | Stop reason: SDUPTHER

## 2021-07-22 NOTE — PROGRESS NOTES
"Subjective   Padmini Holt is a 70 y.o. female.     All over pain, 10/10 at worst, 7/10 at best, 6/10 today, always present, varies, aching, stabbing, worse with all activity, interferes with ADLs, sleep, quality of life, failed meds, injections, surgery, PT. Had multiple injections with Dr. Villalpando and Roddy. Imaging with R TKA, severe L knee OA, full thickness RTC tear. Prior notes reviewed, as abov, with referral for pain management, started on Norco 7.5/325mg BID, then TID, helps but not strong enough, then 10/325mg QID prn. Tried rotating to Percocet with headaches, stopped, restarted, doing well. Had 2 b/l Supartz injections. Poor sleep with pineal gland tumor, has failed \"everything.\" Worsening R hip pain, does not want LESLY. Percocet not lasting 6 hours, but failed Xtampza 18mg BID, cont Percocet. Pain overall worsening. Nasal septal defect worsening. Extreme physical and mental strain caring for her .        The following portions of the patient's history were reviewed and updated as appropriate: allergies, current medications, past family history, past medical history, past social history, past surgical history and problem list.    Review of Systems   Constitutional: Positive for fatigue. Negative for chills and fever.   HENT: Positive for hearing loss. Negative for trouble swallowing.    Eyes: Positive for visual disturbance.   Respiratory: Positive for shortness of breath.    Cardiovascular: Positive for chest pain.   Gastrointestinal: Positive for abdominal pain and nausea. Negative for constipation, diarrhea and vomiting.   Genitourinary: Negative for urinary incontinence.   Musculoskeletal: Positive for arthralgias, back pain, joint swelling and neck pain. Negative for myalgias.   Neurological: Positive for dizziness and headache. Negative for weakness and numbness.       Objective   Physical Exam   Constitutional: She is oriented to person, place, and time. She appears well-developed and " well-nourished.   Neurological: She is alert and oriented to person, place, and time. She has normal reflexes.   Psychiatric: Her behavior is normal. Mood, judgment and thought content normal.         Assessment/Plan   Diagnoses and all orders for this visit:    1. Chronic bilateral low back pain, unspecified whether sciatica present (Primary)    2. Cervical spondylosis without myelopathy    3. Cervical stenosis of spinal canal    4. Chronic pain disorder    5. Lumbosacral spondylosis without myelopathy    6. Neck pain, chronic    7. Chronic left shoulder pain    8. Right hip pain    9. Sacroiliac joint pain    10. Spinal stenosis of lumbar region, unspecified whether neurogenic claudication present        INspect in order. Mod risk per SOAPP. UDS 1/29/21 in order.  Increased to Norco 10/325mg TID prn, then QID, stopped. Began Percocet 10mg QID prn, rotated to Xtampza 18mg BID, tolerant. Rotated to MS-Contin 15mg TID, less effective, stopped, restarted Xtampza 18mg BID, stopped. Rotated back to Percocet 10mg QID prn, increase to 5x/day prn. Was taking as high as Oxycodone 15mg TID in past.   Failed Diclofenac cream. Reordered RxAlt #1 cream.  Cont other meds as prescribed.  Referred to Rheum for prior dz of autoimmune disease.  Failed PT, surgery, injections.  Ordered  PT, eval for other needs.  Had 2nd of 3 L knee Supartz injections, failed steroids in past. Is s/p R TKA.  May need to inject b/l CMC thumb joints.  Referral to [facility] Pate to eval for revision of R TKA.  Schedule R hip intra-articular injection when possible.  Refer to Dr. Summers for nasal septal defect.  Sees [facility] for counseling.  RTC in 3 months for f/u. Telephone visit, spent 11 minutes discussing her meds and plan of care, strategies to help care for her , worsening pain.

## 2021-07-22 NOTE — TELEPHONE ENCOUNTER
Caller: YOLANDA PADILLA    Relationship to patient: SELF    Best call back number: [phone]    Chief complaint: N/A    Type of visit: FOLLOW UP    Requested date: NEXT AVAILABLE    If rescheduling, when is the original appointment: 07.22.21 10:20    Additional notes: PT CALLED TO CANCEL/RESCHEDULE APPT TODAY, BUT NEEDS TO SEE DR. FOSTER BEFORE October (AS SHOWN AS NEXT AVAILABLE) DUE TO MEDICATION REFILLS, ETC.

## 2021-09-05 DIAGNOSIS — J44.9 ASTHMA WITH COPD (HCC): ICD-10-CM

## 2021-09-07 RX ORDER — IPRATROPIUM/ALBUTEROL SULFATE 20-100 MCG
1 MIST INHALER (GRAM) INHALATION 4 TIMES DAILY PRN
Qty: 4 G | Refills: 5 | Status: SHIPPED | OUTPATIENT
Start: 2021-09-07

## 2021-09-10 ENCOUNTER — LAB (OUTPATIENT)
Dept: FAMILY MEDICINE CLINIC | Facility: CLINIC | Age: 71
End: 2021-09-10

## 2021-09-10 ENCOUNTER — OFFICE VISIT (OUTPATIENT)
Dept: FAMILY MEDICINE CLINIC | Facility: CLINIC | Age: 71
End: 2021-09-10

## 2021-09-10 VITALS
WEIGHT: 162 LBS | DIASTOLIC BLOOD PRESSURE: 80 MMHG | HEIGHT: 66 IN | HEART RATE: 71 BPM | RESPIRATION RATE: 16 BRPM | SYSTOLIC BLOOD PRESSURE: 152 MMHG | TEMPERATURE: 97.4 F | OXYGEN SATURATION: 94 % | BODY MASS INDEX: 26.03 KG/M2

## 2021-09-10 DIAGNOSIS — E55.9 VITAMIN D DEFICIENCY: ICD-10-CM

## 2021-09-10 DIAGNOSIS — E53.8 VITAMIN B12 DEFICIENCY: Primary | ICD-10-CM

## 2021-09-10 DIAGNOSIS — R51.9 INCREASED FREQUENCY OF HEADACHES: ICD-10-CM

## 2021-09-10 DIAGNOSIS — R53.83 FATIGUE, UNSPECIFIED TYPE: ICD-10-CM

## 2021-09-10 DIAGNOSIS — M15.9 OSTEOARTHRITIS OF MULTIPLE JOINTS, UNSPECIFIED OSTEOARTHRITIS TYPE: ICD-10-CM

## 2021-09-10 LAB
25(OH)D3 SERPL-MCNC: 19.4 NG/ML
BASOPHILS # BLD AUTO: 0.03 10*3/MM3 (ref 0–0.2)
BASOPHILS NFR BLD AUTO: 0.4 % (ref 0–1.5)
DEPRECATED RDW RBC AUTO: 42.8 FL (ref 37–54)
EOSINOPHIL # BLD AUTO: 0.15 10*3/MM3 (ref 0–0.4)
EOSINOPHIL NFR BLD AUTO: 1.9 % (ref 0.3–6.2)
ERYTHROCYTE [DISTWIDTH] IN BLOOD BY AUTOMATED COUNT: 11.9 % (ref 12.3–15.4)
HCT VFR BLD AUTO: 45.4 % (ref 34–46.6)
HGB BLD-MCNC: 15.2 G/DL (ref 12–15.9)
IMM GRANULOCYTES # BLD AUTO: 0.01 10*3/MM3 (ref 0–0.05)
IMM GRANULOCYTES NFR BLD AUTO: 0.1 % (ref 0–0.5)
LYMPHOCYTES # BLD AUTO: 3.8 10*3/MM3 (ref 0.7–3.1)
LYMPHOCYTES NFR BLD AUTO: 47.7 % (ref 19.6–45.3)
MCH RBC QN AUTO: 33.1 PG (ref 26.6–33)
MCHC RBC AUTO-ENTMCNC: 33.5 G/DL (ref 31.5–35.7)
MCV RBC AUTO: 98.9 FL (ref 79–97)
MONOCYTES # BLD AUTO: 0.72 10*3/MM3 (ref 0.1–0.9)
MONOCYTES NFR BLD AUTO: 9 % (ref 5–12)
NEUTROPHILS NFR BLD AUTO: 3.25 10*3/MM3 (ref 1.7–7)
NEUTROPHILS NFR BLD AUTO: 40.9 % (ref 42.7–76)
NRBC BLD AUTO-RTO: 0 /100 WBC (ref 0–0.2)
PLATELET # BLD AUTO: 242 10*3/MM3 (ref 140–450)
PMV BLD AUTO: 10.5 FL (ref 6–12)
RBC # BLD AUTO: 4.59 10*6/MM3 (ref 3.77–5.28)
TSH SERPL DL<=0.05 MIU/L-ACNC: 0.54 UIU/ML (ref 0.27–4.2)
WBC # BLD AUTO: 7.96 10*3/MM3 (ref 3.4–10.8)

## 2021-09-10 PROCEDURE — 82306 VITAMIN D 25 HYDROXY: CPT | Performed by: PHYSICIAN ASSISTANT

## 2021-09-10 PROCEDURE — 99214 OFFICE O/P EST MOD 30 MIN: CPT | Performed by: PHYSICIAN ASSISTANT

## 2021-09-10 PROCEDURE — 36415 COLL VENOUS BLD VENIPUNCTURE: CPT | Performed by: PHYSICIAN ASSISTANT

## 2021-09-10 PROCEDURE — 84443 ASSAY THYROID STIM HORMONE: CPT | Performed by: PHYSICIAN ASSISTANT

## 2021-09-10 PROCEDURE — 85025 COMPLETE CBC W/AUTO DIFF WBC: CPT | Performed by: PHYSICIAN ASSISTANT

## 2021-09-10 RX ORDER — LANOLIN ALCOHOL/MO/W.PET/CERES
1000 CREAM (GRAM) TOPICAL DAILY
Qty: 30 TABLET | Refills: 11 | Status: SHIPPED | OUTPATIENT
Start: 2021-09-10 | End: 2021-10-04

## 2021-09-10 RX ORDER — CELECOXIB 100 MG/1
100 CAPSULE ORAL 2 TIMES DAILY
Qty: 60 CAPSULE | Refills: 2 | Status: SHIPPED | OUTPATIENT
Start: 2021-09-10 | End: 2022-01-01

## 2021-09-10 NOTE — PROGRESS NOTES
Subjective   Padmini Holt is a 70 y.o. female.     Pt is here to follow up on her depression and arthritis.  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 She does laundry twice daily and has to go up and down twelve steps at a time. She gets pain in her hands and wrists along with bilateral knees, shoulders.  She has chronic pain and does see pain management for her chronic back pain. She has constant headaches and fatigue along with high stress levels.       The following portions of the patient's history were reviewed and updated as appropriate: allergies, current medications, past family history, past medical history, past social history, past surgical history and problem list.  Past Medical History:   Diagnosis Date   • Anxiety    • Anxiety state     Abstraction from [facility] Onset Date uns. and depression. I think a component of her mood and personality is affected by her brain surgery for meningioma and residual bifrontal encephalomalacia. 03-   • Anxiety with depression 04/24/2018    Deteriorated 10-   • Asthma with COPD (CMS/[facility]) 09/04/2018   • Chronic diarrhea 03/07/2014    With irritable bowel syndrome, pancreatic divisum likely causing chronic pancreatitis. -2014   • Chronic neck pain 08/23/2018   • Chronic pain syndrome     Abstraction from [facility] Onset Date uns. With degenerative disc disease, spinal stenosis, chronic back pain. 03-   • COPD, severe (CMS/[facility])     Abstraction from [facility] COPD Severe--FEV1 48% pred (05-) Onset Date uns   • DDD (degenerative disc disease), cervical     Abstraction from [facility] Onset Date uns   • DDD (degenerative disc disease), lumbar     Abstraction from [facility] Onset Date uns   • Degenerative joint disease of left knee 02/22/2018   • Depression, major, recurrent, moderate (CMS/[facility]) 02/06/2018    Deteriorated 04-   • Exertional shortness  of breath 12/19/2018   • Fibromyositis 05/22/2018    Deteriorated 05-   • GERD (gastroesophageal reflux disease) 05/10/2018    [facility] and pancreatic divisum. 11- multiple visits   • H/O diverticulitis of colon     Abstraction from [facility] Onset Date uns   • History of benign meningioma of brain 01/14/2019    [facility]   • History of epilepsy    • History of meningioma 08/01/2010    Do not suspect any serious problem causing her abnormal and atypical scalp symptoms 09-   • History of MRSA infection     Abstraction from [facility] Onset Date uns   • History of seizure disorder     Abstraction from [facility] Onset Date uns   • History of tobacco use     Abstraction from [facility] Onset Date uns   • Hypertension 09/29/2011    NOS    • Insomnia, unspecified 09/29/2011    Stable with 3 mg of traxodone each evening. 05-   • Left shoulder pain 03/14/2018   • Need for vaccination for Strep pneumoniae 12/19/2018    With Prevnar 13   • Obesity 12/19/2018    [facility] 09-    • Osteoarthritis     Abstraction from [facility] Onset Date uns   • Osteoporosis 01/01/1960    and history of vitamin D deficienty. 11-   • Pain in joint involving multiple sites 08/23/2018   • Pancreas divisum     Abstraction from [facility] Onset Date uns   • Personal history of cardiovascular disorder     [facility] Atrial fibrillation paroxysmal, h/o Patient reports that she has apparently had some atrial fibrillation following a prior surgery. 08-   • Pineal gland, tumor    • Preventative health care 09/29/2011    Mammogram May 2012 normal. 11-   • PTSD (post-traumatic stress disorder) 02/06/2018   • Screening mammogram, encounter for     Abstraction from [facility] Onset Date uns   • Sleep apnea    • Spinal stenosis     Abstraction from [facility] Onset Date uns   •  Stroke (cerebrum) (CMS/[facility]) 08/21/2018    CVA/Stroke   • Vitamin B12 deficiency 09/27/2012    Encourage patient to continue B12 supplementation. 01-     Past Surgical History:   Procedure Laterality Date   • APPENDECTOMY     • ARTHROSCOPY SHOULDER W/ OPEN ROTATOR CUFF REPAIR Left 04/13/2018    Abstraction from [facility] Left Shoulder Scope/cuff repair   • BACK SURGERY     • BRAIN SURGERY  08/2010    Brain surgery - meningiomas   • CHOLECYSTECTOMY     • DILATATION AND CURETTAGE     • HYSTERECTOMY      Total hysterectomy - noncancer   • KNEE SURGERY Right     2-(r) knee surgeries   • OTHER SURGICAL HISTORY      ulnar nerve releast   • OTHER SURGICAL HISTORY      Pancreatic Stent    • OTHER SURGICAL HISTORY      Breast Biopsies   • OTHER SURGICAL HISTORY      Tubal ligation   • REFRACTIVE SURGERY     • TOTAL KNEE ARTHROPLASTY Right 09/2013    Abstraction from [email or web address]     Family History   Problem Relation Age of Onset   • COPD Father    • Other Other         Substance Abuse     Social History     Socioeconomic History   • Marital status:      Spouse name: Not on file   • Number of children: Not on file   • Years of education: Not on file   • Highest education level: Not on file   Tobacco Use   • Smoking status: Current Every Day Smoker     Packs/day: 1.00     Years: 40.00     Pack years: 40.00     Types: Cigarettes   • Smokeless tobacco: Never Used   Vaping Use   • Vaping Use: Never used   Substance and Sexual Activity   • Alcohol use: No   • Drug use: No   • Sexual activity: Never         Current Outpatient Medications:   •  ALPRAZolam (XANAX) 1 MG tablet, Take 1 mg by mouth At Night As Needed for Anxiety., Disp: , Rfl:   •  amLODIPine (NORVASC) 10 MG tablet, TAKE 1 TABLET BY MOUTH DAILY WITH LUNCH., Disp: 90 tablet, Rfl: 1  •  atenolol (TENORMIN) 50 MG tablet, TAKE 1 TABLET BY MOUTH TWICE A DAY, Disp: 180 tablet, Rfl: 1  •  fluticasone-salmeterol (ADVAIR) 100-50 MCG/DOSE DISKUS, Inhale 1 puff 2 (Two)  Times a Day. Rinse mouth after each use., Disp: 60 each, Rfl: 5  •  furosemide (LASIX) 40 MG tablet, Take 40 mg by mouth 2 (Two) Times a Day As Needed., Disp: , Rfl:   •  ipratropium-albuterol (Combivent Respimat)  MCG/ACT inhaler, Inhale 1 puff 4 (Four) Times a Day As Needed for Wheezing or Shortness of Air. Dx: J44.9, Disp: 4 g, Rfl: 5  •  nicotine (NICODERM CQ) 21 MG/24HR patch, Place 1 patch on the skin as directed by provider Daily., Disp: 30 each, Rfl: 5  •  ondansetron (ZOFRAN) 4 MG tablet, Take 1 tablet by mouth Every 8 (Eight) Hours As Needed for Nausea or Vomiting., Disp: 30 tablet, Rfl: 3  •  oxyCODONE-acetaminophen (Percocet)  MG per tablet, Take 1 tablet by mouth 5 (Five) Times a Day As Needed for Moderate Pain ., Disp: 150 tablet, Rfl: 0  •  potassium chloride (MICRO-K) 10 MEQ CR capsule, Take 10 mEq by mouth 2 (Two) Times a Day As Needed. Take one tab po w each dose of Lasix , Disp: , Rfl:   •  traZODone (DESYREL) 100 MG tablet, TAKE 2 TABLETS BY MOUTH EVERY DAY AT NIGHT, Disp: , Rfl:     Review of Systems   Constitutional: Positive for fatigue. Negative for activity change, appetite change, chills, diaphoresis, fever, unexpected weight gain and unexpected weight loss.   Respiratory: Negative for chest tightness and shortness of breath.    Cardiovascular: Negative for chest pain, palpitations and leg swelling.   Gastrointestinal: Negative for abdominal pain, nausea and vomiting.   Musculoskeletal: Positive for arthralgias, back pain and joint swelling. Negative for gait problem.   Skin: Negative for rash.   Neurological: Positive for headache. Negative for dizziness, tremors, facial asymmetry, speech difficulty, weakness, light-headedness, numbness, memory problem and confusion.   Psychiatric/Behavioral: Positive for sleep disturbance, depressed mood and stress. Negative for self-injury and suicidal ideas. The patient is nervous/anxious.      /80 (BP Location: Left arm, Patient  "Position: Sitting, Cuff Size: Adult)   Pulse 71   Temp 97.4 °F (36.3 °C) (Temporal)   Resp 16   Ht 167.6 cm (66\")   Wt 73.5 kg (162 lb)   LMP  (LMP Unknown) Comment: hysterectomy  SpO2 94%   BMI 26.15 kg/m²       Objective   Physical Exam  Vitals and nursing note reviewed.   Constitutional:       General: She is not in acute distress.     Appearance: Normal appearance. She is normal weight.   HENT:      Head: Normocephalic and atraumatic.   Neck:      Thyroid: No thyroid mass, thyromegaly or thyroid tenderness.   Cardiovascular:      Rate and Rhythm: Normal rate and regular rhythm.      Heart sounds: Normal heart sounds. No murmur heard.     Pulmonary:      Effort: Pulmonary effort is normal. No respiratory distress.      Breath sounds: Normal breath sounds. No wheezing, rhonchi or rales.   Musculoskeletal:      Cervical back: Normal range of motion and neck supple.      Right lower leg: No edema.      Left lower leg: No edema.   Skin:     General: Skin is warm and dry.   Neurological:      General: No focal deficit present.      Mental Status: She is alert and oriented to person, place, and time.      Cranial Nerves: No cranial nerve deficit.      Sensory: No sensory deficit.      Motor: No weakness.      Coordination: Coordination normal.      Gait: Gait normal.      Deep Tendon Reflexes: Reflexes normal.   Psychiatric:         Mood and Affect: Mood normal.         Behavior: Behavior normal.         Thought Content: Thought content normal.         Procedures     Assessment/Plan   Diagnoses and all orders for this visit:    1. Vitamin B12 deficiency (Primary)  -     vitamin B-12 (CYANOCOBALAMIN) 1000 MCG tablet; Take 1 tablet by mouth Daily.  Dispense: 30 tablet; Refill: 11    2. Osteoarthritis of multiple joints, unspecified osteoarthritis type  -     celecoxib (CeleBREX) 100 MG capsule; Take 1 capsule by mouth 2 (Two) Times a Day.  Dispense: 60 capsule; Refill: 2    3. Increased frequency of headaches  -  "    CT Head Without Contrast; Future  -     TSH  -     CBC & Differential  -     Basic Metabolic Panel    4. Vitamin D deficiency  -     Vitamin D 25 Hydroxy    5. Fatigue, unspecified type  -     TSH        Pt to start taking her b12 supplement and I did send prescription to the pharmacy.  She never came to get injections after her last labs in October 2020.  Will also start on celebrex twice daily with food to see if this helps with her arthritis.  She is also to get labs done today and CT head ordered since having increased headaches.  Discussed that her stress is a large component of her fatigue and headaches.  Neuro exam normal today.  I spent 30 minutes of patient care including reviewing pt's previous hx, reviewing current symptoms, performing exam, ordering tests, discussing treatment plan and completing my note.

## 2021-09-12 DIAGNOSIS — E55.9 VITAMIN D DEFICIENCY: Primary | ICD-10-CM

## 2021-10-04 DIAGNOSIS — E55.9 VITAMIN D DEFICIENCY: ICD-10-CM

## 2021-10-04 DIAGNOSIS — E53.8 VITAMIN B12 DEFICIENCY: ICD-10-CM

## 2021-10-22 ENCOUNTER — OFFICE VISIT (OUTPATIENT)
Dept: PAIN MEDICINE | Facility: CLINIC | Age: 71
End: 2021-10-22

## 2021-10-22 VITALS
HEART RATE: 77 BPM | WEIGHT: 158 LBS | SYSTOLIC BLOOD PRESSURE: 141 MMHG | OXYGEN SATURATION: 93 % | BODY MASS INDEX: 25.39 KG/M2 | DIASTOLIC BLOOD PRESSURE: 73 MMHG | HEIGHT: 66 IN

## 2021-10-22 DIAGNOSIS — G89.4 CHRONIC PAIN DISORDER: ICD-10-CM

## 2021-10-22 DIAGNOSIS — G89.29 CHRONIC BILATERAL LOW BACK PAIN, UNSPECIFIED WHETHER SCIATICA PRESENT: Primary | ICD-10-CM

## 2021-10-22 DIAGNOSIS — M47.812 CERVICAL SPONDYLOSIS WITHOUT MYELOPATHY: ICD-10-CM

## 2021-10-22 DIAGNOSIS — M54.50 CHRONIC BILATERAL LOW BACK PAIN, UNSPECIFIED WHETHER SCIATICA PRESENT: Primary | ICD-10-CM

## 2021-10-22 DIAGNOSIS — M25.512 CHRONIC LEFT SHOULDER PAIN: ICD-10-CM

## 2021-10-22 DIAGNOSIS — M48.061 SPINAL STENOSIS OF LUMBAR REGION, UNSPECIFIED WHETHER NEUROGENIC CLAUDICATION PRESENT: ICD-10-CM

## 2021-10-22 DIAGNOSIS — G89.29 NECK PAIN, CHRONIC: ICD-10-CM

## 2021-10-22 DIAGNOSIS — G89.29 CHRONIC LEFT SHOULDER PAIN: ICD-10-CM

## 2021-10-22 DIAGNOSIS — Z79.899 HIGH RISK MEDICATION USE: Primary | ICD-10-CM

## 2021-10-22 DIAGNOSIS — M48.02 CERVICAL STENOSIS OF SPINAL CANAL: ICD-10-CM

## 2021-10-22 DIAGNOSIS — M53.3 SACROILIAC JOINT PAIN: ICD-10-CM

## 2021-10-22 DIAGNOSIS — M54.2 NECK PAIN, CHRONIC: ICD-10-CM

## 2021-10-22 DIAGNOSIS — M79.7 FIBROMYOSITIS: ICD-10-CM

## 2021-10-22 DIAGNOSIS — M47.817 LUMBOSACRAL SPONDYLOSIS WITHOUT MYELOPATHY: ICD-10-CM

## 2021-10-22 DIAGNOSIS — M25.551 RIGHT HIP PAIN: ICD-10-CM

## 2021-10-22 PROCEDURE — 99213 OFFICE O/P EST LOW 20 MIN: CPT | Performed by: PHYSICAL MEDICINE & REHABILITATION

## 2021-10-22 RX ORDER — ALBUTEROL SULFATE 90 UG/1
AEROSOL, METERED RESPIRATORY (INHALATION)
COMMUNITY
Start: 2021-10-14

## 2021-10-22 RX ORDER — OXYCODONE AND ACETAMINOPHEN 10; 325 MG/1; MG/1
1 TABLET ORAL
Qty: 150 TABLET | Refills: 0 | Status: SHIPPED | OUTPATIENT
Start: 2021-10-22 | End: 2022-01-01 | Stop reason: SDUPTHER

## 2021-10-22 NOTE — PROGRESS NOTES
"Subjective   Padmini Holt is a 71 y.o. female.     All over pain, 10/10 at worst, 7/10 at best, 6/10 today, always present, varies, aching, stabbing, worse with all activity, interferes with ADLs, sleep, quality of life, failed meds, injections, surgery, PT. Had multiple injections with Dr. Villalpando and Roddy. Imaging with R TKA, severe L knee OA, full thickness RTC tear. Prior notes reviewed, as abov, with referral for pain management, started on Norco 7.5/325mg BID, then TID, helps but not strong enough, then 10/325mg QID prn. Tried rotating to Percocet with headaches, stopped, restarted, doing well. Had 2 b/l Supartz injections. Poor sleep with pineal gland tumor, has failed \"everything.\" Worsening R hip pain, does not want LESLY. Percocet not lasting 6 hours, but failed Xtampza 18mg BID, cont Percocet. Pain overall worsening. Nasal septal defect worsening.       The following portions of the patient's history were reviewed and updated as appropriate: allergies, current medications, past family history, past medical history, past social history, past surgical history and problem list.    Review of Systems   Constitutional: Positive for fatigue. Negative for chills and fever.   HENT: Positive for hearing loss. Negative for trouble swallowing.    Eyes: Positive for visual disturbance.   Respiratory: Positive for shortness of breath.    Cardiovascular: Positive for chest pain.   Gastrointestinal: Positive for abdominal pain and nausea. Negative for constipation, diarrhea and vomiting.   Genitourinary: Negative for urinary incontinence.   Musculoskeletal: Positive for arthralgias, back pain, joint swelling and neck pain. Negative for myalgias.   Neurological: Positive for dizziness and headache. Negative for weakness and numbness.       Objective   Physical Exam   Constitutional: She is oriented to person, place, and time. She appears well-developed and well-nourished.   HENT:   Head: Normocephalic and atraumatic. "   Eyes: Pupils are equal, round, and reactive to light.   Cardiovascular: Normal rate, regular rhythm and normal heart sounds.   Pulmonary/Chest: Breath sounds normal.   Abdominal: Soft. Bowel sounds are normal. She exhibits no distension. There is no abdominal tenderness.   Neurological: She is alert and oriented to person, place, and time. She has normal reflexes. She displays normal reflexes. No sensory deficit.   Psychiatric: Her behavior is normal. Thought content normal.         Assessment/Plan   Diagnoses and all orders for this visit:    1. Chronic bilateral low back pain, unspecified whether sciatica present (Primary)    2. Cervical spondylosis without myelopathy    3. Cervical stenosis of spinal canal    4. Chronic pain disorder    5. Fibromyositis    6. Lumbosacral spondylosis without myelopathy    7. Neck pain, chronic    8. Chronic left shoulder pain    9. Right hip pain    10. Sacroiliac joint pain    11. Spinal stenosis of lumbar region, unspecified whether neurogenic claudication present        INspect in order. Mod risk per SOAPP. UDS 1/29/21 in order.  Increased to Norco 10/325mg TID prn, then QID, stopped. Began Percocet 10mg QID prn, rotated to Xtampza 18mg BID, tolerant. Rotated to MS-Contin 15mg TID, less effective, stopped, restarted Xtampza 18mg BID, stopped. Rotated back to Percocet 10mg QID prn, increase to 5x/day prn. Was taking Oxycodone 15mg TID in past.   Failed Diclofenac cream. Reordered RxAlt #1 cream.  Cont other meds as prescribed.  Referred to Rheum for prior dz of autoimmune disease.  Failed PT, surgery, injections.  Ordered  PT[facility]emio for other needs.  Had 2nd of 3 L knee Supartz injections, failed steroids in past. Is s/p R TKA.  May need to inject b/l CMC thumb joints.  Referral to Gayle Pate to artemio for revision of R TKA.  Schedule R hip intra-articular injection when possible.  Refer to Dr. Summers for nasal septal defect.  Sees [facility] for counseling.  RTC in 3  months for f/u.

## 2021-10-27 ENCOUNTER — TELEPHONE (OUTPATIENT)
Dept: FAMILY MEDICINE CLINIC | Facility: CLINIC | Age: 71
End: 2021-10-27

## 2021-10-27 NOTE — TELEPHONE ENCOUNTER
Padmini called into the office to schedule her  an appt with Dr. Pina due to headaches and needing labs. She said her  needed to be seen TODAY and she did not want to hear anything about Dr. Pina being on vacation and that she has papers at home she needs to fill out and can't wait to send in to the proper people about our office. I told her I would have to go back and ask what we could do (We had no open appts today with any providers), she said she did not have time to hold because she was busier than me and she wanted me to call her back. I told her that was fine I could do that and asked her the best number to call her back and she said its right there in front of you I said the number that we had and asked if that was the best and she said yes(all of this she said very rudely). I went back and spoke to the MA and asked what she suggested and she said to see if we had anything today or tomorrow with any provider so I checked and we did have one appt tomorrow with Dr. TIFFANIE Alcaraz so I but him on Dr. Dean schedule so no one would take the appt while I was calling her to see if that would work for them. When I called her to see if that was ok she still wanted him to be seen today and asked if I couldn't just call other people that weren't being seen for anything urgent and reschedule them so he could get in and I told her sorry we are not able to do that. I asked again if she still wanted the appt with Dr. Perkins tomorrow but she wouldn't give me an answer(she was still being very rude) then she proceeded to tell me that she did not like my voice and I told her I'm sorry but this is just my voice and how I talk and she said that she didn't like it and I sounded pretensious I continued to ask if that appt for the next day would work but she just kept complaining and wouldn't answer me and I was getting tearful so I placed her on hold and was going to see if my coworker could speak with her but  she hung up before I could get her. I left the appt on the schedule so that if they wanted it it would still be there but it is not confirmed.

## 2021-11-02 DIAGNOSIS — I10 ESSENTIAL HYPERTENSION: ICD-10-CM

## 2021-11-02 RX ORDER — ATENOLOL 50 MG/1
TABLET ORAL
Qty: 180 TABLET | Refills: 1 | Status: SHIPPED | OUTPATIENT
Start: 2021-11-02

## 2021-11-05 ENCOUNTER — TELEPHONE (OUTPATIENT)
Dept: FAMILY MEDICINE CLINIC | Facility: CLINIC | Age: 71
End: 2021-11-05

## 2021-11-05 NOTE — TELEPHONE ENCOUNTER
Please call patient and let her know that unfortunately we do not have samples of Combivent.  However I can send a new prescription to her pharmacy if needed.

## 2021-11-05 NOTE — TELEPHONE ENCOUNTER
Caller: Padmini Holt    Relationship: Self    Best call back number: [phone]    Which medication are you concerned about: ipratropium-albuterol (Combivent Respimat)  MCG/ACT inhaler    Who prescribed you this medication: DR WU    What are your concerns: PATIENT STATES SHE HAS LOST HER INHALER AND WANTS TO KNOW IF SHE COULD GET SAMPLES OF THIS MEDICATION UNTIL HER REFILL TIME.     PLEASE CALL AND ADVISE SO SHE CAN COME AND PICK THEM UP.

## 2021-11-05 NOTE — TELEPHONE ENCOUNTER
Please call patient and let her know that I have a sample of Spiriva that she can use.  This is not an emergency inhaler but rather a maintenance inhaler.  She is to use this every single day.

## 2021-11-05 NOTE — TELEPHONE ENCOUNTER
If its before refill date they have to pay over $500. Refill date is Nov 20th. Do we have samples of something else she can try in the meantime until her next refill? She can't use albuterol,symbicort, advair, or anything with powder.

## 2021-11-08 NOTE — TELEPHONE ENCOUNTER
She doesn't think she can use Spiriva but may go ahead and try sample. States she cannot use anything with powder. She will have to find someone to  her sample or sit with her  for her to come  sample. She said she may go to ER but will have to find someone to sit with her  first. She says she cannot breathe and needs to been seen. I offered to transfer her to scheduling but she declined and said if she was to make appt it would be with Lori Zapata or Dr. Alcaraz. When I asked her if she was not wanting to see you anymore she said no because you are not proactive enough.   I told her you offered to call her during lunch but she declined.

## 2022-01-01 ENCOUNTER — TELEPHONE (OUTPATIENT)
Dept: PAIN MEDICINE | Facility: CLINIC | Age: 72
End: 2022-01-01

## 2022-01-01 ENCOUNTER — TELEPHONE (OUTPATIENT)
Dept: URGENT CARE | Facility: CLINIC | Age: 72
End: 2022-01-01

## 2022-01-01 ENCOUNTER — HOSPITAL ENCOUNTER (EMERGENCY)
Facility: HOSPITAL | Age: 72
Discharge: LEFT BEFORE DISCHARGE | End: 2022-05-27
Attending: EMERGENCY MEDICINE | Admitting: EMERGENCY MEDICINE

## 2022-01-01 ENCOUNTER — LAB (OUTPATIENT)
Dept: LAB | Facility: HOSPITAL | Age: 72
End: 2022-01-01

## 2022-01-01 ENCOUNTER — OFFICE VISIT (OUTPATIENT)
Dept: PAIN MEDICINE | Facility: CLINIC | Age: 72
End: 2022-01-01

## 2022-01-01 ENCOUNTER — TRANSCRIBE ORDERS (OUTPATIENT)
Dept: ADMINISTRATIVE | Facility: HOSPITAL | Age: 72
End: 2022-01-01

## 2022-01-01 ENCOUNTER — HOSPITAL ENCOUNTER (EMERGENCY)
Facility: HOSPITAL | Age: 72
Discharge: HOME | End: 2022-09-29
Attending: EMERGENCY MEDICINE

## 2022-01-01 ENCOUNTER — APPOINTMENT (OUTPATIENT)
Dept: CARDIOLOGY | Facility: HOSPITAL | Age: 72
End: 2022-01-01

## 2022-01-01 ENCOUNTER — APPOINTMENT (OUTPATIENT)
Dept: GENERAL RADIOLOGY | Facility: HOSPITAL | Age: 72
End: 2022-01-01

## 2022-01-01 VITALS
OXYGEN SATURATION: 95 % | BODY MASS INDEX: 24.91 KG/M2 | HEIGHT: 66 IN | HEART RATE: 77 BPM | TEMPERATURE: 98.2 F | RESPIRATION RATE: 20 BRPM | SYSTOLIC BLOOD PRESSURE: 132 MMHG | WEIGHT: 155 LBS | DIASTOLIC BLOOD PRESSURE: 80 MMHG

## 2022-01-01 VITALS
OXYGEN SATURATION: 90 % | RESPIRATION RATE: 16 BRPM | HEART RATE: 74 BPM | DIASTOLIC BLOOD PRESSURE: 42 MMHG | SYSTOLIC BLOOD PRESSURE: 137 MMHG

## 2022-01-01 VITALS
WEIGHT: 158 LBS | RESPIRATION RATE: 16 BRPM | OXYGEN SATURATION: 93 % | SYSTOLIC BLOOD PRESSURE: 147 MMHG | DIASTOLIC BLOOD PRESSURE: 84 MMHG | HEART RATE: 78 BPM | BODY MASS INDEX: 25.51 KG/M2

## 2022-01-01 VITALS
HEART RATE: 73 BPM | DIASTOLIC BLOOD PRESSURE: 67 MMHG | RESPIRATION RATE: 16 BRPM | OXYGEN SATURATION: 91 % | SYSTOLIC BLOOD PRESSURE: 132 MMHG

## 2022-01-01 VITALS
HEART RATE: 77 BPM | SYSTOLIC BLOOD PRESSURE: 152 MMHG | HEIGHT: 66 IN | WEIGHT: 158 LBS | DIASTOLIC BLOOD PRESSURE: 78 MMHG | OXYGEN SATURATION: 94 % | RESPIRATION RATE: 16 BRPM | BODY MASS INDEX: 25.39 KG/M2

## 2022-01-01 DIAGNOSIS — I10 ESSENTIAL HYPERTENSION, MALIGNANT: ICD-10-CM

## 2022-01-01 DIAGNOSIS — G89.29 CHRONIC BILATERAL LOW BACK PAIN, UNSPECIFIED WHETHER SCIATICA PRESENT: Primary | ICD-10-CM

## 2022-01-01 DIAGNOSIS — M25.551 RIGHT HIP PAIN: ICD-10-CM

## 2022-01-01 DIAGNOSIS — M25.512 CHRONIC LEFT SHOULDER PAIN: ICD-10-CM

## 2022-01-01 DIAGNOSIS — G89.29 CHRONIC BILATERAL LOW BACK PAIN, UNSPECIFIED WHETHER SCIATICA PRESENT: ICD-10-CM

## 2022-01-01 DIAGNOSIS — M47.812 CERVICAL SPONDYLOSIS WITHOUT MYELOPATHY: ICD-10-CM

## 2022-01-01 DIAGNOSIS — M48.02 CERVICAL STENOSIS OF SPINAL CANAL: ICD-10-CM

## 2022-01-01 DIAGNOSIS — G47.00 PERSISTENT DISORDER OF INITIATING OR MAINTAINING SLEEP: ICD-10-CM

## 2022-01-01 DIAGNOSIS — M50.90 DISC DISORDER OF CERVICAL REGION: ICD-10-CM

## 2022-01-01 DIAGNOSIS — J44.9 OBSTRUCTIVE CHRONIC BRONCHITIS WITHOUT EXACERBATION: ICD-10-CM

## 2022-01-01 DIAGNOSIS — M51.36 DEGENERATION OF LUMBAR INTERVERTEBRAL DISC: ICD-10-CM

## 2022-01-01 DIAGNOSIS — R00.2 PALPITATIONS: Primary | ICD-10-CM

## 2022-01-01 DIAGNOSIS — G89.4 CHRONIC PAIN DISORDER: ICD-10-CM

## 2022-01-01 DIAGNOSIS — G89.29 NECK PAIN, CHRONIC: ICD-10-CM

## 2022-01-01 DIAGNOSIS — E55.9 AVITAMINOSIS D: ICD-10-CM

## 2022-01-01 DIAGNOSIS — M54.2 NECK PAIN, CHRONIC: ICD-10-CM

## 2022-01-01 DIAGNOSIS — M48.061 SPINAL STENOSIS OF LUMBAR REGION, UNSPECIFIED WHETHER NEUROGENIC CLAUDICATION PRESENT: ICD-10-CM

## 2022-01-01 DIAGNOSIS — M54.50 CHRONIC BILATERAL LOW BACK PAIN, UNSPECIFIED WHETHER SCIATICA PRESENT: Primary | ICD-10-CM

## 2022-01-01 DIAGNOSIS — M79.7 FIBROMYOSITIS: ICD-10-CM

## 2022-01-01 DIAGNOSIS — J44.9 OBSTRUCTIVE CHRONIC BRONCHITIS WITHOUT EXACERBATION: Primary | ICD-10-CM

## 2022-01-01 DIAGNOSIS — Z79.899 HIGH RISK MEDICATION USE: Primary | ICD-10-CM

## 2022-01-01 DIAGNOSIS — F45.8 ANXIETY HYPERVENTILATION: ICD-10-CM

## 2022-01-01 DIAGNOSIS — M53.3 SACROILIAC JOINT PAIN: ICD-10-CM

## 2022-01-01 DIAGNOSIS — G89.29 CHRONIC LEFT SHOULDER PAIN: ICD-10-CM

## 2022-01-01 DIAGNOSIS — M47.817 LUMBOSACRAL SPONDYLOSIS WITHOUT MYELOPATHY: ICD-10-CM

## 2022-01-01 DIAGNOSIS — M54.50 CHRONIC BILATERAL LOW BACK PAIN, UNSPECIFIED WHETHER SCIATICA PRESENT: ICD-10-CM

## 2022-01-01 DIAGNOSIS — J44.1 COPD EXACERBATION: Primary | ICD-10-CM

## 2022-01-01 DIAGNOSIS — G56.03 BILATERAL CARPAL TUNNEL SYNDROME: ICD-10-CM

## 2022-01-01 DIAGNOSIS — F41.9 ANXIETY HYPERVENTILATION: ICD-10-CM

## 2022-01-01 LAB
25(OH)D3 SERPL-MCNC: 38.1 NG/ML
ALBUMIN SERPL-MCNC: 4.3 G/DL (ref 3.5–5.2)
ALBUMIN SERPL-MCNC: 4.5 G/DL (ref 3.5–5.2)
ALBUMIN/GLOB SERPL: 1.7 G/DL
ALBUMIN/GLOB SERPL: 1.9 G/DL
ALP SERPL-CCNC: 70 U/L (ref 39–117)
ALP SERPL-CCNC: 76 U/L (ref 39–117)
ALT SERPL W P-5'-P-CCNC: 18 U/L (ref 1–33)
ALT SERPL W P-5'-P-CCNC: 21 U/L (ref 1–33)
ANION GAP SERPL CALCULATED.3IONS-SCNC: 12.2 MMOL/L (ref 5–15)
ANION GAP SERPL CALCULATED.3IONS-SCNC: 13 MMOL/L (ref 5–15)
AST SERPL-CCNC: 14 U/L (ref 1–32)
AST SERPL-CCNC: 17 U/L (ref 1–32)
BASOPHILS # BLD AUTO: 0 10*3/MM3 (ref 0–0.2)
BASOPHILS # BLD AUTO: 0.03 10*3/MM3 (ref 0–0.2)
BASOPHILS NFR BLD AUTO: 0.3 % (ref 0–1.5)
BASOPHILS NFR BLD AUTO: 0.6 % (ref 0–1.5)
BILIRUB SERPL-MCNC: 0.2 MG/DL (ref 0–1.2)
BILIRUB SERPL-MCNC: 0.3 MG/DL (ref 0–1.2)
BUN SERPL-MCNC: 12 MG/DL (ref 8–23)
BUN SERPL-MCNC: 16 MG/DL (ref 8–23)
BUN/CREAT SERPL: 19.7 (ref 7–25)
BUN/CREAT SERPL: 23.2 (ref 7–25)
CALCIUM SPEC-SCNC: 9.5 MG/DL (ref 8.6–10.5)
CALCIUM SPEC-SCNC: 9.7 MG/DL (ref 8.6–10.5)
CHLORIDE SERPL-SCNC: 100 MMOL/L (ref 98–107)
CHLORIDE SERPL-SCNC: 101 MMOL/L (ref 98–107)
CHOLEST SERPL-MCNC: 144 MG/DL (ref 0–200)
CO2 SERPL-SCNC: 27.8 MMOL/L (ref 22–29)
CO2 SERPL-SCNC: 30 MMOL/L (ref 22–29)
CREAT SERPL-MCNC: 0.61 MG/DL (ref 0.57–1)
CREAT SERPL-MCNC: 0.69 MG/DL (ref 0.57–1)
DEPRECATED RDW RBC AUTO: 40.2 FL (ref 37–54)
DEPRECATED RDW RBC AUTO: 45.9 FL (ref 37–54)
EGFRCR SERPLBLD CKD-EPI 2021: 92.9 ML/MIN/1.73
EGFRCR SERPLBLD CKD-EPI 2021: 95.7 ML/MIN/1.73
EOSINOPHIL # BLD AUTO: 0.07 10*3/MM3 (ref 0–0.4)
EOSINOPHIL # BLD AUTO: 0.1 10*3/MM3 (ref 0–0.4)
EOSINOPHIL NFR BLD AUTO: 0.8 % (ref 0.3–6.2)
EOSINOPHIL NFR BLD AUTO: 1.2 % (ref 0.3–6.2)
ERYTHROCYTE [DISTWIDTH] IN BLOOD BY AUTOMATED COUNT: 11.8 % (ref 12.3–15.4)
ERYTHROCYTE [DISTWIDTH] IN BLOOD BY AUTOMATED COUNT: 13.2 % (ref 12.3–15.4)
GLOBULIN UR ELPH-MCNC: 2.4 GM/DL
GLOBULIN UR ELPH-MCNC: 2.6 GM/DL
GLUCOSE SERPL-MCNC: 109 MG/DL (ref 65–99)
GLUCOSE SERPL-MCNC: 89 MG/DL (ref 65–99)
HCT VFR BLD AUTO: 40.5 % (ref 34–46.6)
HCT VFR BLD AUTO: 44.4 % (ref 34–46.6)
HDLC SERPL-MCNC: 54 MG/DL (ref 40–60)
HGB BLD-MCNC: 13.9 G/DL (ref 12–15.9)
HGB BLD-MCNC: 14.4 G/DL (ref 12–15.9)
IMM GRANULOCYTES # BLD AUTO: 0.01 10*3/MM3 (ref 0–0.05)
IMM GRANULOCYTES NFR BLD AUTO: 0.1 % (ref 0–0.5)
LDLC SERPL CALC-MCNC: 73 MG/DL (ref 0–100)
LDLC/HDLC SERPL: 1.33 {RATIO}
LYMPHOCYTES # BLD AUTO: 2.1 10*3/MM3 (ref 0.7–3.1)
LYMPHOCYTES # BLD AUTO: 2.4 10*3/MM3 (ref 0.7–3.1)
LYMPHOCYTES NFR BLD AUTO: 27.4 % (ref 19.6–45.3)
LYMPHOCYTES NFR BLD AUTO: 30.6 % (ref 19.6–45.3)
MCH RBC QN AUTO: 32.7 PG (ref 26.6–33)
MCH RBC QN AUTO: 32.9 PG (ref 26.6–33)
MCHC RBC AUTO-ENTMCNC: 32.5 G/DL (ref 31.5–35.7)
MCHC RBC AUTO-ENTMCNC: 34.3 G/DL (ref 31.5–35.7)
MCV RBC AUTO: 100.8 FL (ref 79–97)
MCV RBC AUTO: 95.7 FL (ref 79–97)
MONOCYTES # BLD AUTO: 0.7 10*3/MM3 (ref 0.1–0.9)
MONOCYTES # BLD AUTO: 0.89 10*3/MM3 (ref 0.1–0.9)
MONOCYTES NFR BLD AUTO: 10 % (ref 5–12)
MONOCYTES NFR BLD AUTO: 10.2 % (ref 5–12)
NEUTROPHILS NFR BLD AUTO: 4 10*3/MM3 (ref 1.7–7)
NEUTROPHILS NFR BLD AUTO: 5.35 10*3/MM3 (ref 1.7–7)
NEUTROPHILS NFR BLD AUTO: 57.6 % (ref 42.7–76)
NEUTROPHILS NFR BLD AUTO: 61.2 % (ref 42.7–76)
NRBC BLD AUTO-RTO: 0 /100 WBC (ref 0–0.2)
NRBC BLD AUTO-RTO: 0.1 /100 WBC (ref 0–0.2)
NT-PROBNP SERPL-MCNC: 135.5 PG/ML (ref 0–900)
PLATELET # BLD AUTO: 307 10*3/MM3 (ref 140–450)
PLATELET # BLD AUTO: 412 10*3/MM3 (ref 140–450)
PMV BLD AUTO: 7.7 FL (ref 6–12)
PMV BLD AUTO: 9.4 FL (ref 6–12)
POTASSIUM SERPL-SCNC: 3.7 MMOL/L (ref 3.5–5.2)
POTASSIUM SERPL-SCNC: 4 MMOL/L (ref 3.5–5.2)
PROT SERPL-MCNC: 6.9 G/DL (ref 6–8.5)
PROT SERPL-MCNC: 6.9 G/DL (ref 6–8.5)
QT INTERVAL: 401 MS
RBC # BLD AUTO: 4.23 10*6/MM3 (ref 3.77–5.28)
RBC # BLD AUTO: 4.4 10*6/MM3 (ref 3.77–5.28)
SODIUM SERPL-SCNC: 141 MMOL/L (ref 136–145)
SODIUM SERPL-SCNC: 143 MMOL/L (ref 136–145)
TRIGL SERPL-MCNC: 92 MG/DL (ref 0–150)
TSH SERPL DL<=0.05 MIU/L-ACNC: 0.89 UIU/ML (ref 0.27–4.2)
VIT B12 BLD-MCNC: 414 PG/ML (ref 211–946)
VLDLC SERPL-MCNC: 17 MG/DL (ref 5–40)
WBC NRBC COR # BLD: 7 10*3/MM3 (ref 3.4–10.8)
WBC NRBC COR # BLD: 8.75 10*3/MM3 (ref 3.4–10.8)

## 2022-01-01 PROCEDURE — 83880 ASSAY OF NATRIURETIC PEPTIDE: CPT | Performed by: NURSE PRACTITIONER

## 2022-01-01 PROCEDURE — 93005 ELECTROCARDIOGRAM TRACING: CPT | Performed by: NURSE PRACTITIONER

## 2022-01-01 PROCEDURE — 82607 VITAMIN B-12: CPT

## 2022-01-01 PROCEDURE — 85025 COMPLETE CBC W/AUTO DIFF WBC: CPT | Performed by: NURSE PRACTITIONER

## 2022-01-01 PROCEDURE — 99213 OFFICE O/P EST LOW 20 MIN: CPT | Performed by: PHYSICAL MEDICINE & REHABILITATION

## 2022-01-01 PROCEDURE — 99211 OFF/OP EST MAY X REQ PHY/QHP: CPT | Performed by: EMERGENCY MEDICINE

## 2022-01-01 PROCEDURE — 80053 COMPREHEN METABOLIC PANEL: CPT | Performed by: NURSE PRACTITIONER

## 2022-01-01 PROCEDURE — 82306 VITAMIN D 25 HYDROXY: CPT

## 2022-01-01 PROCEDURE — 80050 GENERAL HEALTH PANEL: CPT

## 2022-01-01 PROCEDURE — 99282 EMERGENCY DEPT VISIT SF MDM: CPT

## 2022-01-01 PROCEDURE — 71045 X-RAY EXAM CHEST 1 VIEW: CPT

## 2022-01-01 PROCEDURE — 94799 UNLISTED PULMONARY SVC/PX: CPT

## 2022-01-01 PROCEDURE — 80061 LIPID PANEL: CPT

## 2022-01-01 PROCEDURE — 99214 OFFICE O/P EST MOD 30 MIN: CPT | Performed by: PHYSICAL MEDICINE & REHABILITATION

## 2022-01-01 PROCEDURE — 94640 AIRWAY INHALATION TREATMENT: CPT

## 2022-01-01 RX ORDER — OXYCODONE AND ACETAMINOPHEN 10; 325 MG/1; MG/1
1 TABLET ORAL
Qty: 150 TABLET | Refills: 0 | Status: SHIPPED | OUTPATIENT
Start: 2022-01-01 | End: 2022-01-01 | Stop reason: SDUPTHER

## 2022-01-01 RX ORDER — METHYLPREDNISOLONE SODIUM SUCCINATE 125 MG/2ML
125 INJECTION, POWDER, LYOPHILIZED, FOR SOLUTION INTRAMUSCULAR; INTRAVENOUS ONCE
Status: DISCONTINUED | OUTPATIENT
Start: 2022-01-01 | End: 2022-01-01 | Stop reason: HOSPADM

## 2022-01-01 RX ORDER — ALBUTEROL SULFATE 90 UG/1
AEROSOL, METERED RESPIRATORY (INHALATION)
Refills: 5 | OUTPATIENT
Start: 2022-01-01

## 2022-01-01 RX ORDER — OXYCODONE AND ACETAMINOPHEN 10; 325 MG/1; MG/1
1 TABLET ORAL
Qty: 150 TABLET | Refills: 0 | Status: SHIPPED | OUTPATIENT
Start: 2022-01-01

## 2022-01-01 RX ORDER — SODIUM CHLORIDE 0.9 % (FLUSH) 0.9 %
10 SYRINGE (ML) INJECTION AS NEEDED
Status: DISCONTINUED | OUTPATIENT
Start: 2022-01-01 | End: 2022-01-01 | Stop reason: HOSPADM

## 2022-01-01 RX ORDER — OXYCODONE AND ACETAMINOPHEN 10; 325 MG/1; MG/1
1 TABLET ORAL
Qty: 35 TABLET | Refills: 0 | Status: SHIPPED | OUTPATIENT
Start: 2022-01-01 | End: 2022-01-01 | Stop reason: SDUPTHER

## 2022-01-01 RX ORDER — DULOXETIN HYDROCHLORIDE 20 MG/1
20 CAPSULE, DELAYED RELEASE ORAL NIGHTLY
Qty: 30 CAPSULE | Refills: 1 | Status: SHIPPED | OUTPATIENT
Start: 2022-01-01

## 2022-01-01 RX ORDER — IPRATROPIUM BROMIDE AND ALBUTEROL SULFATE 2.5; .5 MG/3ML; MG/3ML
3 SOLUTION RESPIRATORY (INHALATION) ONCE
Status: DISCONTINUED | OUTPATIENT
Start: 2022-01-01 | End: 2022-01-01 | Stop reason: HOSPADM

## 2022-01-01 RX ORDER — DULOXETIN HYDROCHLORIDE 20 MG/1
20 CAPSULE, DELAYED RELEASE ORAL NIGHTLY
Qty: 30 CAPSULE | Refills: 1 | Status: SHIPPED | OUTPATIENT
Start: 2022-01-01 | End: 2022-01-01

## 2022-01-14 NOTE — PROGRESS NOTES
"Subjective   Padmini Holt is a 71 y.o. female.     All over pain, 10/10 at worst, 7/10 at best, 7/10 today, always present, varies, aching, stabbing, worse with all activity, interferes with ADLs, sleep, quality of life, failed meds, injections, surgery, PT. Had multiple injections with Dr. Villalpando and Roddy. Imaging with R TKA, severe L knee OA, full thickness RTC tear. Prior notes reviewed, as abov, with referral for pain management, started on Norco 7.5/325mg BID, then TID, helps but not strong enough, then 10/325mg QID prn. Tried rotating to Percocet with headaches, stopped, restarted, doing well. Had 2 b/l Supartz injections. Poor sleep with pineal gland tumor, has failed \"everything.\" Worsening R hip pain, does not want LESLY. Percocet not lasting 6 hours, but failed Xtampza 18mg BID, continued Percocet. Pain overall worsening. Nasal septal defect worsening.       The following portions of the patient's history were reviewed and updated as appropriate: allergies, current medications, past family history, past medical history, past social history, past surgical history and problem list.    Review of Systems   Constitutional: Positive for fatigue. Negative for chills and fever.   HENT: Positive for hearing loss. Negative for trouble swallowing.    Eyes: Positive for visual disturbance.   Respiratory: Positive for shortness of breath.    Cardiovascular: Positive for chest pain.   Gastrointestinal: Positive for abdominal pain and nausea. Negative for constipation, diarrhea and vomiting.   Genitourinary: Negative for urinary incontinence.   Musculoskeletal: Positive for arthralgias, back pain, joint swelling and neck pain. Negative for myalgias.   Neurological: Positive for dizziness and headache. Negative for weakness and numbness.       Objective   Physical Exam   Constitutional: She is oriented to person, place, and time. She appears well-developed and well-nourished.   HENT:   Head: Normocephalic and atraumatic. "   Eyes: Pupils are equal, round, and reactive to light.   Cardiovascular: Normal rate, regular rhythm and normal heart sounds.   Pulmonary/Chest: Breath sounds normal.   Abdominal: Soft. Bowel sounds are normal. She exhibits no distension. There is no abdominal tenderness.   Neurological: She is alert and oriented to person, place, and time. She has normal reflexes. She displays normal reflexes. No sensory deficit.   Psychiatric: Her behavior is normal. Thought content normal.         Assessment/Plan   Diagnoses and all orders for this visit:    1. Chronic bilateral low back pain, unspecified whether sciatica present (Primary)    2. Neck pain, chronic    3. Cervical spondylosis without myelopathy    4. Cervical stenosis of spinal canal    5. Chronic pain disorder    6. Fibromyositis    7. Lumbosacral spondylosis without myelopathy    8. Chronic left shoulder pain    9. Right hip pain    10. Sacroiliac joint pain    11. Spinal stenosis of lumbar region, unspecified whether neurogenic claudication present        INspect in order. Mod risk per SOAPP. UDS 10/22//21 in order.  Increased to Norco 10/325mg TID prn, then QID, stopped. Began Percocet 10mg QID prn, rotated to Xtampza 18mg BID, tolerant. Rotated to MS-Contin 15mg TID, less effective, stopped, restarted Xtampza 18mg BID, stopped. Rotated back to Percocet 10mg QID prn, increased to 5x/day prn. Was taking Oxycodone 15mg TID in past.   Failed Diclofenac cream. Reordered RxAlt #1 cream.  Cont other meds as prescribed.  Referred to Rheum for prior dz of autoimmune disease.  Failed PT, surgery, injections.  Ordered  PTartemio for other needs.  Had 2nd of 3 L knee Supartz injections, failed steroids in past. Is s/p R TKA.  May need to inject b/l [facility] thumb joints.  Referral to Gayle Pate to artemio for revision of R TKA.  Schedule R hip intra-articular injection when possible.  Refer to Dr. Summers for nasal septal defect.  Sees [facility] for counseling.  RTC in 3  months for f/u.

## 2022-03-28 NOTE — TELEPHONE ENCOUNTER
Rx Refill Note  Requested Prescriptions     Pending Prescriptions Disp Refills   • oxyCODONE-acetaminophen (Percocet)  MG per tablet 150 tablet 0     Sig: Take 1 tablet by mouth 5 (Five) Times a Day As Needed for Moderate Pain .      Last office visit with prescribing clinician: 1/14/2022      Next office visit with prescribing clinician: 4/19/2022            Julieta Winchester MA  03/28/22, 15:43 EDT

## 2022-03-29 NOTE — TELEPHONE ENCOUNTER
PA sent via cover my meds. Waiting on clinical questions from [facility] to submit.

## 2022-03-29 NOTE — TELEPHONE ENCOUNTER
PRASHANT FROM [facility] CALLED TO SAY THAT THE OXYCODONE WAS ON A HOLD, AND NEEDS A PA FOR THE INSURANCE IN ORDER TO BE FILLED. SHE SAID SHE SENT A MESSAGE OVER THIS MORNING BUT WANTED TO MAKE SURE THAT THE OFFICE WAS MADE AWARE OF THIS. SHE WOULD LIKE A CALL WHEN THE PA HAS GONE THROUGH, PHONE #[phone], SAID IT IS ALL RIGHT TO LEAVE A VOICEMAIL AS WELL.

## 2022-04-19 NOTE — PROGRESS NOTES
"Subjective   Padmini Holt is a 71 y.o. female.     All over pain, 10/10 at worst, 7/10 at best, 6/10 today, always present, varies, aching, stabbing, worse with all activity, interferes with ADLs, sleep, quality of life, failed meds, injections, surgery, PT. Had multiple injections with Dr. Villalpando and Roddy. Imaging with R TKA, severe L knee OA, full thickness RTC tear. Prior notes reviewed, as abov, with referral for pain management, started on Norco 7.5/325mg BID, then TID, helps but not strong enough, then 10/325mg QID prn. Tried rotating to Percocet with headaches, stopped, restarted, doing well. Had 2 b/l Supartz injections. Poor sleep with pineal gland tumor, has failed \"everything.\" Worsening R hip pain, does not want LESLY. Percocet not lasting 6 hours, but failed Xtampza 18mg BID, continued Percocet. Pain overall worsening. Nasal septal defect worsening.    Back Pain  Associated symptoms include abdominal pain and chest pain. Pertinent negatives include no bladder incontinence, fever, numbness or weakness.        The following portions of the patient's history were reviewed and updated as appropriate: allergies, current medications, past family history, past medical history, past social history, past surgical history and problem list.    Review of Systems   Constitutional: Positive for fatigue. Negative for chills and fever.   HENT: Positive for hearing loss. Negative for trouble swallowing.    Eyes: Positive for visual disturbance.   Respiratory: Positive for shortness of breath.    Cardiovascular: Positive for chest pain.   Gastrointestinal: Positive for abdominal pain and nausea. Negative for constipation, diarrhea and vomiting.   Genitourinary: Negative for urinary incontinence.   Musculoskeletal: Positive for arthralgias, back pain, joint swelling and neck pain. Negative for myalgias.   Neurological: Positive for dizziness and headache. Negative for weakness and numbness.       Objective   Physical Exam "   Constitutional: She is oriented to person, place, and time. She appears well-developed and well-nourished.   HENT:   Head: Normocephalic and atraumatic.   Eyes: Pupils are equal, round, and reactive to light.   Cardiovascular: Normal rate, regular rhythm and normal heart sounds.   Pulmonary/Chest: Breath sounds normal.   Abdominal: Soft. Bowel sounds are normal. She exhibits no distension. There is no abdominal tenderness.   Neurological: She is alert and oriented to person, place, and time. She has normal reflexes. She displays normal reflexes. No sensory deficit.   Psychiatric: Her behavior is normal. Thought content normal.         Assessment/Plan   Diagnoses and all orders for this visit:    1. Chronic bilateral low back pain, unspecified whether sciatica present (Primary)    2. Chronic pain disorder    3. Cervical spondylosis without myelopathy    4. Cervical stenosis of spinal canal    5. Fibromyositis    6. Neck pain, chronic    7. Chronic left shoulder pain    8. Right hip pain    9. Sacroiliac joint pain    10. Spinal stenosis of lumbar region, unspecified whether neurogenic claudication present        INspect in order. Mod risk per SOAPP. UDS 10/22/21 in order.  Increased to Norco 10/325mg TID prn, then QID, stopped. Began Percocet 10mg QID prn, rotated to Xtampza 18mg BID, tolerant. Rotated to MS-Contin 15mg TID, less effective, stopped, restarted Xtampza 18mg BID, stopped. Rotated back to Percocet 10mg QID prn, increased to 5x/day prn. Was taking Oxycodone 15mg TID in past.   Failed Diclofenac cream. Reordered RxAlt #1 cream.  Cont other meds as prescribed.  Referred to Rheum for prior dz of autoimmune disease.  Failed PT, surgery, injections.  Ordered  PT, artemio for other needs.  Had 2nd of 3 L knee Supartz injections, failed steroids in past. Is s/p R TKA.  May need to inject b/l CMC thumb joints.  Referral to Gayle Pate to artemio for revision of R TKA.  Schedule R hip intra-articular injection when  possible.  Referred to Dr. Summers for nasal septal defect.  Sees [facility] for counseling.  RTC in 3 months for f/u.

## 2022-04-19 NOTE — TELEPHONE ENCOUNTER
Caller: DR. FOSTER    Relationship: SELF    Best call back number: [phone]    Requested Prescriptions:    oxyCODONE-acetaminophen (Percocet)  MG per tablet     Pharmacy where request should be sent:  [address], [place] [address] PHONE : [phone]    Additional details provided by patient: WANT HER PRESCRIPTION SENT OVER TO NEW PHARMACY.    Does the patient have less than a 3 day supply:  [x] Yes  [] No    Stephen Pantoja Rep   04/19/22 16:00 EDT

## 2022-04-19 NOTE — TELEPHONE ENCOUNTER
4/19/22-- left vm Dr Hanna sent  medications from visit today to [facility] in [place]-- told her to call back if wants it changed-- Dr Hanna has already left today-- hub can tell pt

## 2022-05-27 NOTE — ED NOTES
"Pt c/o Soa the last 3 years. 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 She got him in a nursing home and now she can worry about herself. Pt states she has edema often and takes a water pill. She stated that she took that \"water pill and potassium pill\" today. The left leg is 2+ edema. Pt is alert and oriented.   " Was A Bandage Applied: Yes Validate Anticipated Plan: No Dressing: bandage Information: Selecting Yes will display possible errors in your note based on the variables you have selected. This validation is only offered as a suggestion for you. PLEASE NOTE THAT THE VALIDATION TEXT WILL BE REMOVED WHEN YOU FINALIZE YOUR NOTE. IF YOU WANT TO FAX A PRELIMINARY NOTE YOU WILL NEED TO TOGGLE THIS TO 'NO' IF YOU DO NOT WANT IT IN YOUR FAXED NOTE. Body Location Override (Optional - Billing Will Still Be Based On Selected Body Map Location If Applicable): posterior lateral clavicular neck Notification Instructions: Patient will be notified of biopsy results. However, patient instructed to call the office if not contacted within 2 weeks. Anesthesia Type: 1% lidocaine with epinephrine and a 1:10 solution of 8.4% sodium bicarbonate Post-Care Instructions: I reviewed with the patient in detail post-care instructions. Patient is to keep the biopsy site dry overnight, and then apply bacitracin twice daily until healed. Patient may apply hydrogen peroxide soaks to remove any crusting. Type Of Destruction Used: Curettage Silver Nitrate Text: The wound bed was treated with silver nitrate after the biopsy was performed. Additional Anesthesia Volume In Cc (Will Not Render If 0): 0 Biopsy Method: Dermablade Electrodesiccation And Curettage Text: The wound bed was treated with electrodesiccation and curettage after the biopsy was performed. Depth Of Biopsy: dermis Consent: Written consent was obtained and risks were reviewed including but not limited to scarring, infection, bleeding, scabbing, incomplete removal, nerve damage and allergy to anesthesia. Anesthesia Volume In Cc (Will Not Render If 0): 0.5 Billing Type: Third-Party Bill Path Notes (To The Dermatopathologist): Please check margins Biopsy Type: H and E Body Location Override (Optional - Billing Will Still Be Based On Selected Body Map Location If Applicable): anterior left clavicular skin Size Of Lesion In Cm: 0.4 Cryotherapy Text: The wound bed was treated with cryotherapy after the biopsy was performed. Hemostasis: Electrocautery Curettage Text: The wound bed was treated with curettage after the biopsy was performed. Wound Care: Petrolatum Detail Level: Detailed Electrodesiccation Text: The wound bed was treated with electrodesiccation after the biopsy was performed. Size Of Lesion In Cm: 0.3

## 2022-05-27 NOTE — ED PROVIDER NOTES
Subjective   Patient is a 71-year-old female who states she has been short of breath for 3 years.  She states that she has been at home being a total caregiver to her  who was recently placed in [facility] and now feels like it is time to her to come to the hospital and be evaluated.  She states she has a history of COPD-she states that she has been sleeping sitting up in a recliner she states that she is really short of breath when she gets up and walks around.  She states that she has some inhalers at home that were given to her by [facility] who was taking care of her  she states they were taking care of her as well she states she does not have a primary care provider.  She denies chest pain.-  She has an angry disposition-          Review of Systems   Constitutional: Negative for chills, fatigue and fever.   HENT: Negative for congestion, tinnitus and trouble swallowing.    Eyes: Negative for photophobia, discharge and redness.   Respiratory: Positive for shortness of breath. Negative for cough.    Cardiovascular: Negative for chest pain and palpitations.   Gastrointestinal: Negative for abdominal pain, diarrhea, nausea and vomiting.   Genitourinary: Negative for dysuria, frequency and urgency.   Musculoskeletal: Negative for back pain, joint swelling and myalgias.   Skin: Negative for rash.   Neurological: Negative for dizziness and headaches.   Psychiatric/Behavioral: Negative for confusion.   All other systems reviewed and are negative.      Past Medical History:   Diagnosis Date   • Anxiety    • Anxiety state     Abstraction from [facility] Onset Date uns. and depression. I think a component of her mood and personality is affected by her brain surgery for meningioma and residual bifrontal encephalomalacia. 03-   • Anxiety with depression 04/24/2018    Deteriorated 10-   • Asthma with COPD (HCC) 09/04/2018   • Chronic diarrhea 03/07/2014    With irritable bowel  syndrome, pancreatic divisum likely causing chronic pancreatitis. -2014   • Chronic neck pain 08/23/2018   • Chronic pain syndrome     [facility] Onset Date uns. With degenerative disc disease, spinal stenosis, chronic back pain. 03-   • COPD, severe (HCC)     Abstraction from [facility] COPD Severe--FEV1 48% pred (05-) Onset Date uns   • DDD (degenerative disc disease), cervical     Abstraction from [facility] Onset Date uns   • DDD (degenerative disc disease), lumbar     [facility]raction from [facility] Onset Date uns   • Degenerative joint disease of left knee 02/22/2018   • Depression, major, recurrent, moderate (HCC) 02/06/2018    Deteriorated 04-   • Exertional shortness of breath 12/19/2018   • Fibromyositis 05/22/2018    Deteriorated 05-   • GERD (gastroesophageal reflux disease) 05/10/2018    Abstraction from [facility] Chronic nausea and pancreatic divisum. 11- multiple visits   • H/O diverticulitis of colon     [facility]raction from [facility] Onset Date uns   • History of benign meningioma of brain 01/14/2019    Abstraction from [facility] Meningioma, brain   • History of epilepsy    • History of meningioma 08/01/2010    Do not suspect any serious problem causing her abnormal and atypical scalp symptoms 09-   • History of MRSA infection     Abstraction from [facility] Onset Date uns   • History of seizure disorder     Abstraction from [facility] Onset Date uns   • History of tobacco use     Ab[facility]raction from [facility] Onset Date uns   • Hypertension 09/29/2011    NOS    • Insomnia, unspecified 09/29/2011    Stable with 3 mg of traxodone each evening. 05-   • Left shoulder pain 03/14/2018   • Need for vaccination for Strep pneumoniae 12/19/2018    With Prevnar 13   • Obesity 12/19/2018    [facility]-    • Osteoarthritis     Abstraction from [facility] Onset Date uns   • Osteoporosis 01/01/1960    and  history of vitamin D deficienty. 11-   • Pain in joint involving multiple sites 08/23/2018   • Pancreas divisum     Abstraction from [facility] Onset Date uns   • Personal history of cardiovascular disorder     Abstraction from [facility] Atrial fibrillation paroxysmal, h/o Patient reports that she has apparently had some atrial fibrillation following a prior surgery. 08-   • Pineal gland, tumor    • Preventative health care 09/29/2011    Mammogram May 2012 normal. 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 01-       Allergies   Allergen Reactions   • Pregabalin Other (See Comments)     Abstraction from [facility] Reaction Throat Swelling  Other reaction(s): throat swellied   • Gabapentin Other (See Comments)     Abstraction from [facility] Reaction Burns her up  Other reaction(s): burns her up  FEELS ILL NAUSEA , ACHES, FEVER,PAIN     • Citalopram Other (See Comments)     nausea   • Codeine Other (See Comments)     FLU LIKE SYMPTOMS, ACHING, NAUSEA   • Duloxetine Hcl Other (See Comments)     nausea   • Parathyroid Hormone (Recomb) Nausea Only   • Vortioxetine Other (See Comments)     FLU LIKE SYMPTOMS, ACHING, FEVER , SORE MOUTH,NAUSEA       Past Surgical History:   Procedure Laterality Date   • APPENDECTOMY     • ARTHROSCOPY SHOULDER W/ OPEN ROTATOR CUFF REPAIR Left 04/13/2018    Abstraction from [facility] Left Shoulder Scope/cuff repair   • BACK SURGERY     • BRAIN SURGERY  08/2010    Brain surgery - meningiomas   • CHOLECYSTECTOMY     • DILATATION AND CURETTAGE     • HYSTERECTOMY      Total hysterectomy - noncancer   • KNEE SURGERY Right     2-(r) knee  surgeries   • OTHER SURGICAL HISTORY      ulnar nerve releast   • OTHER SURGICAL HISTORY      Pancreatic Stent    • OTHER SURGICAL HISTORY      Breast Biopsies   • OTHER SURGICAL HISTORY      Tubal ligation   • REFRACTIVE SURGERY     • TOTAL KNEE ARTHROPLASTY Right 09/2013    Abstraction from [facility]       Family History   Problem Relation Age of Onset   • COPD Father    • Other Other         Substance Abuse       Social History     Socioeconomic History   • Marital status:    Tobacco Use   • Smoking status: Current Every Day Smoker     Packs/day: 1.00     Years: 40.00     Pack years: 40.00     Types: Cigarettes   • Smokeless tobacco: Never Used   Vaping Use   • Vaping Use: Never used   Substance and Sexual Activity   • Alcohol use: No   • Drug use: No   • Sexual activity: Never           Objective   Physical Exam  Vitals reviewed.   Constitutional:       Appearance: Normal appearance. She is well-developed and normal weight.   HENT:      Head: Normocephalic and atraumatic.      Right Ear: External ear normal.      Left Ear: External ear normal.      Nose: Nose normal.      Mouth/Throat:      Mouth: Mucous membranes are moist.   Eyes:      Conjunctiva/sclera: Conjunctivae normal.      Pupils: Pupils are equal, round, and reactive to light.   Cardiovascular:      Rate and Rhythm: Normal rate and regular rhythm.      Pulses: Normal pulses.      Heart sounds: Normal heart sounds.   Pulmonary:      Effort: Pulmonary effort is normal. No respiratory distress.      Breath sounds: Examination of the right-lower field reveals decreased breath sounds. Examination of the left-lower field reveals decreased breath sounds. Decreased breath sounds present. No wheezing.   Abdominal:      General: Bowel sounds are normal. There is no distension.      Palpations: Abdomen is soft. There is no mass.      Tenderness: There is no abdominal tenderness. There is no guarding or rebound.   Musculoskeletal:         General: No  "deformity. Normal range of motion.      Cervical back: Normal range of motion and neck supple.   Skin:     General: Skin is warm and dry.      Capillary Refill: Capillary refill takes less than 2 seconds.   Neurological:      Mental Status: She is alert and oriented to person, place, and time.      GCS: GCS eye subscore is 4. GCS verbal subscore is 5. GCS motor subscore is 6.      Cranial Nerves: No cranial nerve deficit.      Sensory: No sensory deficit.      Deep Tendon Reflexes: Reflexes normal.   Psychiatric:         Mood and Affect: Mood normal.         Behavior: Behavior normal.         Procedures       EKG shows sinus rhythm with a rate of 73 no significant change from previous dated 1/16/2020 reviewed by myself read by Dr. Morgna    ED Course  ED Course as of 05/27/22 0913   Fri May 27, 2022   0835 Patient refused breathing treatments [KW]   0840 Patient is wishing to leave AGAINST MEDICAL ADVICE.  I spoke with her and tried to get her to stay she states that she is upset because no one answered her call light.  She is fully dressed she is wishing to have her IV taken out.  Advised her that she needs to follow-up with primary care I advised her that I did not have her exam complete and that she needed to return if worse she understands that leaving AGAINST MEDICAL ADVICE can include death she is agreeable to leaving against medical advice [KW]   0910 Nursing staff reports that the patient is now refusing to sign her AMA forms. [KW]      ED Course User Index  [KW] Poppy Herr, APRN    /80 (BP Location: Left arm, Patient Position: Sitting)   Pulse 77   Temp 98.2 °F (36.8 °C) (Temporal)   Resp 20   Ht 167.6 cm (66\")   Wt 70.3 kg (155 lb)   LMP  (LMP Unknown) Comment: hysterectomy  SpO2 95%   BMI 25.02 kg/m²   Labs Reviewed   COMPREHENSIVE METABOLIC PANEL - Abnormal; Notable for the following components:       Result Value    Glucose 109 (*)     CO2 30.0 (*)     All other components within " normal limits    Narrative:     GFR Normal >60  Chronic Kidney Disease <60  Kidney Failure <15     CBC WITH AUTO DIFFERENTIAL - Abnormal; Notable for the following components:    .8 (*)     All other components within normal limits   BNP (IN-HOUSE) - Normal    Narrative:     Among patients with dyspnea, NT-proBNP is highly sensitive for the detection of acute congestive heart failure. In addition NT-proBNP of <300 pg/ml effectively rules out acute congestive heart failure with 99% negative predictive value.    Results may be falsely decreased if patient taking Biotin.     CBC AND DIFFERENTIAL    Narrative:     The following orders were created for panel order CBC & Differential.  Procedure                               Abnormality         Status                     ---------                               -----------         ------                     CBC Auto Differential[[id number]]        Abnormal            Final result                 Please view results for these tests on the individual orders.     Medications   sodium chloride 0.9 % flush 10 mL (has no administration in time range)   methylPREDNISolone sodium succinate (SOLU-Medrol) injection 125 mg (125 mg Intravenous Not Given 5/27/22 0845)   ipratropium-albuterol (DUO-NEB) nebulizer solution 3 mL (3 mL Nebulization Not Given 5/27/22 0827)     XR Chest 1 View    Result Date: 5/27/2022  IMPRESSION : Hyperexpansion of lungs compatible with COPD.  No focal consolidation[  Electronically Signed By-Darnell Reyes On:5/27/2022 8:05 AM This report was finalized on [id number] by  Darnell Reyes, .                                                 MDM  Number of Diagnoses or Management Options  Diagnosis management comments: Patient had IV established and blood work was obtained.  The patient became angry after she states it took someone too long to answer her call light.  I spoke to her she wishes to leave AGAINST MEDICAL ADVICE    I advised her that she  needs to follow-up with primary care-she verbalized understood this    She left ambulatory on her own accord       Amount and/or Complexity of Data Reviewed  Clinical lab tests: reviewed  Tests in the radiology section of CPT®: reviewed  Tests in the medicine section of CPT®: reviewed    Risk of Complications, Morbidity, and/or Mortality  Presenting problems: minimal  Diagnostic procedures: minimal  Management options: minimal    Patient Progress  Patient progress: stable      Final diagnoses:   COPD exacerbation (HCC)       ED Disposition  ED Disposition     ED Disposition   Eloped    Condition   --    Comment   --             No follow-up provider specified.       Medication List      No changes were made to your prescriptions during this visit.          Poppy Herr, APRN  05/27/22 0913

## 2022-07-19 NOTE — PROGRESS NOTES
"Subjective   Padmini Holt is a 71 y.o. female.     All over pain, 10/10 at worst, 7/10 at best, 10/10 today, always present, varies, aching, stabbing, worse with all activity, interferes with ADLs, sleep, quality of life, failed meds, injections, surgery, PT. Had multiple injections with Dr. Villalpando and Roddy. Imaging with R TKA, severe L knee OA, full thickness RTC tear. Prior notes reviewed, as abov, with referral for pain management, started on Norco 7.5/325mg BID, then TID, helps but not strong enough, then 10/325mg QID prn. Tried rotating to Percocet with headaches, stopped, restarted, doing well. Had 2 b/l Supartz injections. Poor sleep with pineal gland tumor, has failed \"everything.\" Worsening R hip pain, does not want LESLY. Percocet not lasting 6 hours, but failed Xtampza 18mg BID, continued Percocet. Pain overall worsening. Nasal septal defect worsening.    Back Pain  Associated symptoms include abdominal pain and chest pain. Pertinent negatives include no bladder incontinence, fever, numbness or weakness.        The following portions of the patient's history were reviewed and updated as appropriate: allergies, current medications, past family history, past medical history, past social history, past surgical history and problem list.    Review of Systems   Constitutional: Positive for fatigue. Negative for chills and fever.   HENT: Positive for hearing loss. Negative for trouble swallowing.    Eyes: Positive for visual disturbance.   Respiratory: Positive for shortness of breath.    Cardiovascular: Positive for chest pain.   Gastrointestinal: Positive for abdominal pain and nausea. Negative for constipation, diarrhea and vomiting.   Genitourinary: Negative for urinary incontinence.   Musculoskeletal: Positive for arthralgias, back pain, joint swelling and neck pain. Negative for myalgias.   Neurological: Positive for dizziness and headache. Negative for weakness and numbness.       Objective   Physical Exam "   Constitutional: She is oriented to person, place, and time. She appears well-developed and well-nourished.   HENT:   Head: Normocephalic and atraumatic.   Eyes: Pupils are equal, round, and reactive to light.   Cardiovascular: Normal rate, regular rhythm and normal heart sounds.   Pulmonary/Chest: Breath sounds normal.   Abdominal: Soft. Bowel sounds are normal. She exhibits no distension. There is no abdominal tenderness.   Neurological: She is alert and oriented to person, place, and time. She has normal reflexes. She displays normal reflexes. No sensory deficit.   Psychiatric: Her behavior is normal. Thought content normal.         Assessment & Plan   Diagnoses and all orders for this visit:    1. Chronic bilateral low back pain, unspecified whether sciatica present (Primary)    2. Neck pain, chronic    3. Bilateral carpal tunnel syndrome    4. Cervical spondylosis without myelopathy    5. Cervical stenosis of spinal canal    6. Chronic pain disorder    7. Fibromyositis    8. Lumbosacral spondylosis without myelopathy    9. Chronic left shoulder pain    10. Right hip pain    11. Sacroiliac joint pain    12. Spinal stenosis of lumbar region, unspecified whether neurogenic claudication present        INspect in order. Mod risk per SOAPP. UDS 10/22/21 in order.  Increased to Norco 10/325mg TID prn, then QID, stopped. Began Percocet 10mg QID prn, rotated to Xtampza 18mg BID, tolerant. Rotated to MS-Contin 15mg TID, less effective, stopped, restarted Xtampza 18mg BID, stopped. Rotated back to Percocet 10mg QID prn, increased to 5x/day prn. Was taking Oxycodone 15mg TID in past.   Allergic to Lyrica and Gabapentin. Begin Cymbalta 20mg qHS for nerve pain, only causes her nausea, must try something new.  Failed Diclofenac cream. Reordered RxAlt #1 cream.  Cont other meds as prescribed.  Referred to Rheum for prior dz of autoimmune disease.  Failed PT, surgery, injections.  Ordered  PT, eval for other needs.  Had 2nd  of 3 L knee Supartz injections, failed steroids in past. Is s/p R TKA.  May need to inject b/l CMC thumb joints.  Referral to Gayle Pate to artemio for revision of R TKA.  Will schedule R hip intra-articular injection when possible.  Referred to Dr. Summers for nasal septal defect.  [facility]  RTC in 3 months for f/u.

## 2022-08-16 NOTE — TELEPHONE ENCOUNTER
Caller: YOLANDA     Relationship to patient: SELF    Best call back number: [phone]    Chief complaint: MED REFILL    Type of visit: FOLLOW UP     Requested date: NEXT AVAIL     If rescheduling, when is the original appointment: 8.16.22      PT IS UNABLE IF SHE CAN RESCHEDULE OR NOT , NEED TO REFILL MEDS IF SHE IS UNABLE TO COME IN FOR ANOTHER APPT . PLEASE ADVISE

## 2022-08-29 NOTE — TELEPHONE ENCOUNTER
Sent Percocet  mg 5 times daily for 7 days.  Recommend following up with Dr. Hanna.  Inspect and UDS reviewed.

## 2022-08-29 NOTE — TELEPHONE ENCOUNTER
Provider: CRISTIAN  Caller: YOLANDA  Pharmacy: Pharmacy:  [id number] [place] [id number] AT SEC OF [place] [address] & [facility] [address]  - [phone]  - [phone]    Phone Number: [phone]    Reason for Call: PT IS CALLING TO REPORT SHE HAS HAD PNEUMONIA FOR FEW WEEKS AND SHE IS BEEN OUT OF HER MEDICATION. SHE HOPING YOU CAN GET HER WORKED IN SOONER, OR CALL HER IN ENOUGH TO LAST TIL HER APPT   PT WAS VERY TEARFUL AND WOULD LIKE A CALL BACK AS SOON AS POSSIBLE

## 2022-09-27 NOTE — PROGRESS NOTES
"Subjective   Padmini Holt is a 72 y.o. female.     History of Present Illness  All over pain, 10/10 at worst, 7/10 at best, 10/10 today, always present, varies, aching, stabbing, worse with all activity, interferes with ADLs, sleep, quality of life, failed meds, injections, surgery, PT. Had multiple injections with Dr. Villalpando and Roddy. Imaging with R TKA, severe L knee OA, full thickness RTC tear. Prior notes reviewed, as abov, with referral for pain management, started on Norco 7.5/325mg BID, then TID, helps but not strong enough, then 10/325mg QID prn. Tried rotating to Percocet with headaches, stopped, restarted, doing well. Had 2 b/l Supartz injections. Poor sleep with pineal gland tumor, has failed \"everything.\" Worsening R hip pain, does not want LESLY. Percocet not lasting 6 hours, but failed Xtampza 18mg BID, continued Percocet. Pain overall worsening. Nasal septal defect worsening. Developed trigeminal neuralgia on left, resolved with 2 rounds of steroids.   Back Pain  Associated symptoms include abdominal pain and chest pain. Pertinent negatives include no bladder incontinence, fever, numbness or weakness.        The following portions of the patient's history were reviewed and updated as appropriate: allergies, current medications, past family history, past medical history, past social history, past surgical history and problem list.    Review of Systems   Constitutional: Positive for fatigue. Negative for chills and fever.   HENT: Positive for hearing loss. Negative for trouble swallowing.    Eyes: Positive for visual disturbance.   Respiratory: Positive for shortness of breath.    Cardiovascular: Positive for chest pain.   Gastrointestinal: Positive for abdominal pain and nausea. Negative for constipation, diarrhea and vomiting.   Genitourinary: Negative for urinary incontinence.   Musculoskeletal: Positive for arthralgias, back pain, joint swelling and neck pain. Negative for myalgias.   Neurological: " Positive for dizziness and headache. Negative for weakness and numbness.       Objective   Physical Exam   Constitutional: She is oriented to person, place, and time. She appears well-developed and well-nourished.   HENT:   Head: Normocephalic and atraumatic.   Eyes: Pupils are equal, round, and reactive to light.   Cardiovascular: Normal rate, regular rhythm and normal heart sounds.   Pulmonary/Chest: Breath sounds normal.   Abdominal: Soft. Bowel sounds are normal. She exhibits no distension. There is no abdominal tenderness.   Neurological: She is alert and oriented to person, place, and time. She has normal reflexes. She displays normal reflexes. No sensory deficit.   Psychiatric: Her behavior is normal. Thought content normal.         Assessment & Plan   Diagnoses and all orders for this visit:    1. Chronic bilateral low back pain, unspecified whether sciatica present (Primary)    2. Neck pain, chronic    3. Cervical spondylosis without myelopathy    4. Cervical stenosis of spinal canal    5. Chronic pain disorder    6. Fibromyositis    7. Chronic left shoulder pain    8. Right hip pain    9. Sacroiliac joint pain    10. Spinal stenosis of lumbar region, unspecified whether neurogenic claudication present        INspect in order. Mod risk per SOAPP. UDS 10/22/21 in order.  Increased to Norco 10/325mg TID prn, then QID, stopped. Began Percocet 10mg QID prn, rotated to Xtampza 18mg BID, tolerant. Rotated to MS-Contin 15mg TID, less effective, stopped, restarted Xtampza 18mg BID, stopped. Rotated back to Percocet 10mg QID prn, increased to 5x/day prn. Was taking Oxycodone 15mg TID in past.   Allergic to Lyrica and Gabapentin. Began Cymbalta 20mg qHS for nerve pain, only causes her nausea, must try something new.  Failed Diclofenac cream. Reordered RxAlt #1 cream.  Cont other meds as prescribed.  Referred to Rheum for prior dz of autoimmune disease.  Failed PT, surgery, injections.  Ordered  PT, eval for other  needs.  Had 2nd of 3 L knee Supartz injections, failed steroids in past. Is s/p R TKA.  May need to inject b/l CMC thumb joints.  Referral to Gayle Pate to artemio for revision of R TKA.  Will schedule R hip intra-articular injection when possible.  Referred to Dr. Summers for nasal septal defect.  [facility]  She has home health nursing, she will have them provide wound care for where her drain was on her left scalp.  RTC in 3 months for f/u.

## 2022-10-25 NOTE — TELEPHONE ENCOUNTER
Spoke with patient about difficulty arranging for the home oxygen that she preferred. They need to have  6 minute walk test performed and could only then consider oxygen with the proper diagnosis and order.I let her know that I contacted her PCP and they will be following up. She was greatful for our help.

## 2022-12-13 ENCOUNTER — TELEPHONE (OUTPATIENT)
Dept: PAIN MEDICINE | Facility: CLINIC | Age: 72
End: 2022-12-13

## 2022-12-13 NOTE — TELEPHONE ENCOUNTER
"“Please be informed that patient has passed. Patient has been marked  in the system. The date of death is: 22 \".    Caller: PRAVIN    Relationship: DAUGHTER    Best call back number: [phone]  "

## 2023-09-11 NOTE — TELEPHONE ENCOUNTER
I would like to begin the process to dismiss patient.    She has been belligerent to office staff and myself on multiple occasions. She is unwilling to be seen in the office but is accusing me of not being proactive in her care. I have only seen her 4 times since she established care in 2019.     Her behavior is not conducive for an appropriate patient/physician relationship.     Please let me know if I need to draft a letter.        UNK